# Patient Record
Sex: MALE | Race: WHITE | NOT HISPANIC OR LATINO | URBAN - METROPOLITAN AREA
[De-identification: names, ages, dates, MRNs, and addresses within clinical notes are randomized per-mention and may not be internally consistent; named-entity substitution may affect disease eponyms.]

---

## 2017-10-18 ENCOUNTER — OUTPATIENT (OUTPATIENT)
Dept: OUTPATIENT SERVICES | Facility: HOSPITAL | Age: 62
LOS: 1 days | Discharge: HOME | End: 2017-10-18

## 2017-10-18 DIAGNOSIS — Z01.818 ENCOUNTER FOR OTHER PREPROCEDURAL EXAMINATION: ICD-10-CM

## 2017-10-18 DIAGNOSIS — M16.9 OSTEOARTHRITIS OF HIP, UNSPECIFIED: ICD-10-CM

## 2017-10-18 DIAGNOSIS — M16.11 UNILATERAL PRIMARY OSTEOARTHRITIS, RIGHT HIP: ICD-10-CM

## 2017-11-08 ENCOUNTER — INPATIENT (INPATIENT)
Facility: HOSPITAL | Age: 62
LOS: 1 days | Discharge: NURSING, REHAB OR LONG-TERM CARE | End: 2017-11-10
Attending: ORTHOPAEDIC SURGERY

## 2017-11-08 DIAGNOSIS — M16.9 OSTEOARTHRITIS OF HIP, UNSPECIFIED: ICD-10-CM

## 2017-11-10 ENCOUNTER — INPATIENT (INPATIENT)
Facility: HOSPITAL | Age: 62
LOS: 6 days | Discharge: HOME HEALTH | End: 2017-11-17
Attending: PHYSICAL MEDICINE & REHABILITATION

## 2017-11-10 DIAGNOSIS — M16.9 OSTEOARTHRITIS OF HIP, UNSPECIFIED: ICD-10-CM

## 2017-11-13 DIAGNOSIS — M17.0 BILATERAL PRIMARY OSTEOARTHRITIS OF KNEE: ICD-10-CM

## 2017-11-13 DIAGNOSIS — E66.9 OBESITY, UNSPECIFIED: ICD-10-CM

## 2017-11-13 DIAGNOSIS — F41.9 ANXIETY DISORDER, UNSPECIFIED: ICD-10-CM

## 2017-11-13 DIAGNOSIS — J45.909 UNSPECIFIED ASTHMA, UNCOMPLICATED: ICD-10-CM

## 2017-11-13 DIAGNOSIS — M16.11 UNILATERAL PRIMARY OSTEOARTHRITIS, RIGHT HIP: ICD-10-CM

## 2017-11-13 DIAGNOSIS — I10 ESSENTIAL (PRIMARY) HYPERTENSION: ICD-10-CM

## 2017-11-13 DIAGNOSIS — M16.0 BILATERAL PRIMARY OSTEOARTHRITIS OF HIP: ICD-10-CM

## 2017-11-20 DIAGNOSIS — G47.33 OBSTRUCTIVE SLEEP APNEA (ADULT) (PEDIATRIC): ICD-10-CM

## 2017-11-20 DIAGNOSIS — Z47.1 AFTERCARE FOLLOWING JOINT REPLACEMENT SURGERY: ICD-10-CM

## 2017-11-20 DIAGNOSIS — E66.9 OBESITY, UNSPECIFIED: ICD-10-CM

## 2017-11-20 DIAGNOSIS — J45.909 UNSPECIFIED ASTHMA, UNCOMPLICATED: ICD-10-CM

## 2017-11-20 DIAGNOSIS — Z96.641 PRESENCE OF RIGHT ARTIFICIAL HIP JOINT: ICD-10-CM

## 2017-11-20 DIAGNOSIS — K59.00 CONSTIPATION, UNSPECIFIED: ICD-10-CM

## 2017-11-20 DIAGNOSIS — I10 ESSENTIAL (PRIMARY) HYPERTENSION: ICD-10-CM

## 2021-11-21 ENCOUNTER — EMERGENCY (EMERGENCY)
Facility: HOSPITAL | Age: 66
LOS: 0 days | Discharge: HOME | End: 2021-11-21
Attending: EMERGENCY MEDICINE | Admitting: EMERGENCY MEDICINE
Payer: MEDICARE

## 2021-11-21 VITALS
OXYGEN SATURATION: 95 % | DIASTOLIC BLOOD PRESSURE: 77 MMHG | TEMPERATURE: 98 F | RESPIRATION RATE: 18 BRPM | WEIGHT: 229.94 LBS | SYSTOLIC BLOOD PRESSURE: 133 MMHG | HEART RATE: 58 BPM

## 2021-11-21 DIAGNOSIS — R10.32 LEFT LOWER QUADRANT PAIN: ICD-10-CM

## 2021-11-21 DIAGNOSIS — J45.909 UNSPECIFIED ASTHMA, UNCOMPLICATED: ICD-10-CM

## 2021-11-21 DIAGNOSIS — K57.92 DIVERTICULITIS OF INTESTINE, PART UNSPECIFIED, WITHOUT PERFORATION OR ABSCESS WITHOUT BLEEDING: ICD-10-CM

## 2021-11-21 DIAGNOSIS — I10 ESSENTIAL (PRIMARY) HYPERTENSION: ICD-10-CM

## 2021-11-21 LAB
ALBUMIN SERPL ELPH-MCNC: 4.2 G/DL — SIGNIFICANT CHANGE UP (ref 3.5–5.2)
ALP SERPL-CCNC: 71 U/L — SIGNIFICANT CHANGE UP (ref 30–115)
ALT FLD-CCNC: 24 U/L — SIGNIFICANT CHANGE UP (ref 0–41)
ANION GAP SERPL CALC-SCNC: 14 MMOL/L — SIGNIFICANT CHANGE UP (ref 7–14)
APPEARANCE UR: CLEAR — SIGNIFICANT CHANGE UP
AST SERPL-CCNC: 16 U/L — SIGNIFICANT CHANGE UP (ref 0–41)
BASOPHILS # BLD AUTO: 0.04 K/UL — SIGNIFICANT CHANGE UP (ref 0–0.2)
BASOPHILS NFR BLD AUTO: 0.4 % — SIGNIFICANT CHANGE UP (ref 0–1)
BILIRUB SERPL-MCNC: 1.1 MG/DL — SIGNIFICANT CHANGE UP (ref 0.2–1.2)
BILIRUB UR-MCNC: NEGATIVE — SIGNIFICANT CHANGE UP
BUN SERPL-MCNC: 11 MG/DL — SIGNIFICANT CHANGE UP (ref 10–20)
CALCIUM SERPL-MCNC: 9.6 MG/DL — SIGNIFICANT CHANGE UP (ref 8.5–10.1)
CHLORIDE SERPL-SCNC: 103 MMOL/L — SIGNIFICANT CHANGE UP (ref 98–110)
CO2 SERPL-SCNC: 20 MMOL/L — SIGNIFICANT CHANGE UP (ref 17–32)
COLOR SPEC: YELLOW — SIGNIFICANT CHANGE UP
CREAT SERPL-MCNC: 0.8 MG/DL — SIGNIFICANT CHANGE UP (ref 0.7–1.5)
DIFF PNL FLD: NEGATIVE — SIGNIFICANT CHANGE UP
EOSINOPHIL # BLD AUTO: 0.09 K/UL — SIGNIFICANT CHANGE UP (ref 0–0.7)
EOSINOPHIL NFR BLD AUTO: 0.9 % — SIGNIFICANT CHANGE UP (ref 0–8)
GLUCOSE SERPL-MCNC: 123 MG/DL — HIGH (ref 70–99)
GLUCOSE UR QL: NEGATIVE — SIGNIFICANT CHANGE UP
HCT VFR BLD CALC: 43 % — SIGNIFICANT CHANGE UP (ref 42–52)
HGB BLD-MCNC: 15.1 G/DL — SIGNIFICANT CHANGE UP (ref 14–18)
IMM GRANULOCYTES NFR BLD AUTO: 0.5 % — HIGH (ref 0.1–0.3)
KETONES UR-MCNC: ABNORMAL
LACTATE SERPL-SCNC: 0.9 MMOL/L — SIGNIFICANT CHANGE UP (ref 0.7–2)
LEUKOCYTE ESTERASE UR-ACNC: NEGATIVE — SIGNIFICANT CHANGE UP
LIDOCAIN IGE QN: 23 U/L — SIGNIFICANT CHANGE UP (ref 7–60)
LYMPHOCYTES # BLD AUTO: 1.12 K/UL — LOW (ref 1.2–3.4)
LYMPHOCYTES # BLD AUTO: 10.9 % — LOW (ref 20.5–51.1)
MCHC RBC-ENTMCNC: 32.1 PG — HIGH (ref 27–31)
MCHC RBC-ENTMCNC: 35.1 G/DL — SIGNIFICANT CHANGE UP (ref 32–37)
MCV RBC AUTO: 91.3 FL — SIGNIFICANT CHANGE UP (ref 80–94)
MONOCYTES # BLD AUTO: 0.96 K/UL — HIGH (ref 0.1–0.6)
MONOCYTES NFR BLD AUTO: 9.4 % — HIGH (ref 1.7–9.3)
NEUTROPHILS # BLD AUTO: 7.97 K/UL — HIGH (ref 1.4–6.5)
NEUTROPHILS NFR BLD AUTO: 77.9 % — HIGH (ref 42.2–75.2)
NITRITE UR-MCNC: NEGATIVE — SIGNIFICANT CHANGE UP
NRBC # BLD: 0 /100 WBCS — SIGNIFICANT CHANGE UP (ref 0–0)
PH UR: 6 — SIGNIFICANT CHANGE UP (ref 5–8)
PLATELET # BLD AUTO: 264 K/UL — SIGNIFICANT CHANGE UP (ref 130–400)
POTASSIUM SERPL-MCNC: 4.1 MMOL/L — SIGNIFICANT CHANGE UP (ref 3.5–5)
POTASSIUM SERPL-SCNC: 4.1 MMOL/L — SIGNIFICANT CHANGE UP (ref 3.5–5)
PROT SERPL-MCNC: 6.5 G/DL — SIGNIFICANT CHANGE UP (ref 6–8)
PROT UR-MCNC: SIGNIFICANT CHANGE UP
RBC # BLD: 4.71 M/UL — SIGNIFICANT CHANGE UP (ref 4.7–6.1)
RBC # FLD: 11.7 % — SIGNIFICANT CHANGE UP (ref 11.5–14.5)
SODIUM SERPL-SCNC: 137 MMOL/L — SIGNIFICANT CHANGE UP (ref 135–146)
SP GR SPEC: 1.03 — SIGNIFICANT CHANGE UP (ref 1.01–1.03)
UROBILINOGEN FLD QL: SIGNIFICANT CHANGE UP
WBC # BLD: 10.23 K/UL — SIGNIFICANT CHANGE UP (ref 4.8–10.8)
WBC # FLD AUTO: 10.23 K/UL — SIGNIFICANT CHANGE UP (ref 4.8–10.8)

## 2021-11-21 PROCEDURE — 99284 EMERGENCY DEPT VISIT MOD MDM: CPT

## 2021-11-21 PROCEDURE — 74177 CT ABD & PELVIS W/CONTRAST: CPT | Mod: 26,MA

## 2021-11-21 RX ORDER — CIPROFLOXACIN LACTATE 400MG/40ML
1 VIAL (ML) INTRAVENOUS
Qty: 14 | Refills: 0
Start: 2021-11-21 | End: 2021-11-27

## 2021-11-21 RX ORDER — METRONIDAZOLE 500 MG
1 TABLET ORAL
Qty: 21 | Refills: 0
Start: 2021-11-21 | End: 2021-11-27

## 2021-11-21 RX ORDER — SODIUM CHLORIDE 9 MG/ML
1000 INJECTION INTRAMUSCULAR; INTRAVENOUS; SUBCUTANEOUS ONCE
Refills: 0 | Status: COMPLETED | OUTPATIENT
Start: 2021-11-21 | End: 2021-11-21

## 2021-11-21 RX ORDER — MOXIFLOXACIN HYDROCHLORIDE TABLETS, 400 MG 400 MG/1
1 TABLET, FILM COATED ORAL
Qty: 10 | Refills: 0
Start: 2021-11-21 | End: 2021-11-25

## 2021-11-21 RX ORDER — KETOROLAC TROMETHAMINE 30 MG/ML
15 SYRINGE (ML) INJECTION ONCE
Refills: 0 | Status: DISCONTINUED | OUTPATIENT
Start: 2021-11-21 | End: 2021-11-21

## 2021-11-21 RX ADMIN — SODIUM CHLORIDE 1000 MILLILITER(S): 9 INJECTION INTRAMUSCULAR; INTRAVENOUS; SUBCUTANEOUS at 11:24

## 2021-11-21 NOTE — ED PROVIDER NOTE - CLINICAL SUMMARY MEDICAL DECISION MAKING FREE TEXT BOX
Patient presented with LLQ abdominal pain x several days. (+) tender on exam but otherwise afebrile, HD stable, well appearing. Obtained labs which were grossly unremarkable including no significant leukocytosis, anemia, signs of dehydration/KAROL, transaminitis or significant electrolyte abnormalities. UA negative for infection. CT abd/pelvis showed (+) acute uncomplicated diverticulitis but otherwise negative for other emergent processes. Patient felt much better after tx in ED, and serial abdominal exams benign. Able to tolerate PO. Given the above, will discharge home with PO abx and outpatient follow up. Patient agreeable with plan. Agrees to return to ED for any new or worsening symptoms.

## 2021-11-21 NOTE — ED PROVIDER NOTE - PHYSICAL EXAMINATION
GENERAL: Well-nourished, Well-developed. NAD.  HEAD: No visible or palpable bumps or hematomas. No ecchymosis behind ears B/L.  Eyes: PERRLA, EOMI. No asymmetry. No nystagmus. No conjunctival injection. Non-icteric sclera.  ENMT: MMM.   Neck: Supple. FROM  CVS: RRR. Normal S1,S2. No murmurs appreciated on auscultation   RESP: No use of accessory muscles. Chest rise symmetrical with good expansion. Lungs clear to auscultation B/L. No wheezing, rales, or rhonchi auscultated.  GI: Normal auscultation of bowel sounds in all 4 quadrants. (+)LLQ ttp. Soft, Nondistended. No guarding or rebound tenderness. No CVAT B/L.  Skin: Warm, Dry. No rashes or lesions. Good cap refill < 2 sec B/L.  EXT: Radial and pedal pulses present B/L. No calf tenderness or swelling B/L. No palpable cords. No pedal edema B/L.  Neuro: AA&O x 3. Sensation grossly intact. Strength 5/5 B/L. Gait within normal limits.

## 2021-11-21 NOTE — ED PROVIDER NOTE - NS ED ROS FT
Constitutional: (-) fever (-) malaise (-) diaphoresis (-) chills (-) wt. loss (-) body aches (-) night sweats  Eyes: (-) visual changes (-) eye pain (-) eye discharge (-) photophobia (-) FB sensation  ENMT: (-) nasal or chest congestion (-) runny nose (-) sore throat (-) hoarseness  (-) hearing changes (-) ear pain (-) ear discharge or infections (-) neck pain (-) neck stiffness  Cardiac: (-) chest pain  (-) palpitations (-) syncope (-) edema  Respiratory: (-) cough (-) SOB (-) CORDERO  GI: (-) nausea (-) vomiting (-) diarrhea (+) abdominal pain   : (-) dysuria (-) increased frequency  (-) hematuria (-) incontinence  MS: (-) back pain (-) myalgia (-) muscle weakness (-)  joint pain  Neuro: (-) headache (-) dizziness (-) numbness/tingling to extremities B/L (-) weakness   Skin: (-) rash (-) laceration    Except as documented in the HPI, all other systems are negative.

## 2021-11-21 NOTE — ED PROVIDER NOTE - OBJECTIVE STATEMENT
67 yo M pmhx diverticulitis, asthma, HTN, presenting to the ED for evaluation of LLQ pain x 3 days. Pt reporting constant, sharp, non radiating LLQ pain, reports pain feels the same as when he had diverticulitis 10 years ago. Denies any alleviating or provoking factors. Denies any associated symptoms. Denies fever, chills, nausea, vomiting, diarrhea, constipation, chest pain, sob, dysuria, hematuria, testicular pain.

## 2021-11-21 NOTE — ED ADULT TRIAGE NOTE - CHIEF COMPLAINT QUOTE
"I have diverticulitis and was sent here from clinic for CT scan" intermittent pain. denies n/v/d fever.

## 2021-11-21 NOTE — ED PROVIDER NOTE - ATTENDING CONTRIBUTION TO CARE
66 year old male, pmhx as documented presenting with 3 days of LLQ abdominal pain described as sharp, non-radiating, no palliative or provocative factors, moderate severity. Otherwise denies fevers, N/V/D, blood in stool, urinary symptoms or any other complaints.    Vital Signs: I have reviewed the initial vital signs.  Constitutional: NAD, well-nourished, appears stated age, no acute distress.  HEENT: Airway patent, moist MM, no erythema/swelling/deformity of oral structures. EOMI, PERRLA.  CV: regular rate, regular rhythm, well-perfused extremities, 2+ b/l DP and radial pulses equal.  Lungs: BCTA, no increased WOB.  ABD: (+) LLQ tenderness, no guarding or rebound, no pulsatile mass, no hernias.   MSK: Neck supple, nontender, nl ROM, no stepoff. Chest nontender. Back nontender in TLS spine or to b/l bony structures or flanks. Ext nontender, nl rom, no deformity.   INTEG: Skin warm, dry, no rash.  NEURO: A&Ox3, normal strength, nl sensation throughout, normal speech.   PSYCH: Calm, cooperative, normal affect and interaction.    Will obtain labs, CT abd/pelvis, UA, pain control PRN, re-eval.

## 2021-11-21 NOTE — ED PROVIDER NOTE - PATIENT PORTAL LINK FT
You can access the FollowMyHealth Patient Portal offered by [facility] by registering at the following website: [email or web address]. By joining [employer]’s FollowMyHealth portal, you will also be able to view your health information using other applications (apps) compatible with our system.

## 2021-11-21 NOTE — ED PROVIDER NOTE - CARE PROVIDER_API CALL
Franky Faulkner (DO)  Gastroenterology  [address] [place] [id number]  Phone: [phone]  Fax: [phone]  Follow Up Time: 1-3 Days

## 2021-11-21 NOTE — ED ADULT NURSE NOTE - OBJECTIVE STATEMENT
Pt sent in by [facility] for diverticulitis. Pt states he went to an [facility] with c/o abd pain and dx with diverticulitis, but referred to ED for CT scan. Pt endorses generalized abd pain. Denies n/v/fever/diarrhea.

## 2021-11-22 LAB
CULTURE RESULTS: NO GROWTH — SIGNIFICANT CHANGE UP
SPECIMEN SOURCE: SIGNIFICANT CHANGE UP

## 2022-01-20 NOTE — ED ADULT NURSE NOTE - CAS EDP DISCH TYPE
Transitions of Care Call  Call within 2 business days of discharge: No    Patient: Felipe Castellanos Patient : 1996   MRN: [id number]  Reason for Admission: Covid, Seizure   Discharge Date: 22 RARS: Readmission Risk Score: 5 ( )      Last Discharge [facility]       Complaint Diagnosis Description Type Department Provider    22 Seizures  ED to Hosp-Admission (Discharged) (ADMITTED) Beti Andre MD; Jose Morales . .. Was this an external facility discharge? No Discharge Facility: n/a    Challenges to be reviewed by the provider   Additional needs identified to be addressed with provider: No  none                 Encounter was not routed to provider for escalation. Method of communication with provider: none. Second and final outreach call attempt, no answer. CTN left VM with contact information and request for return call. CTN will resolve episode and remain available. Cannot route message to PCP to facilitate Hospital follow up appointment due to [facility] pcp. No active mychart.     BREONNA Paniagua, RN   Care Transition Nurse  Mobile: [phone] Home negative...

## 2022-05-09 ENCOUNTER — EMERGENCY (EMERGENCY)
Facility: HOSPITAL | Age: 67
LOS: 0 days | Discharge: HOME | End: 2022-05-09
Attending: EMERGENCY MEDICINE | Admitting: EMERGENCY MEDICINE
Payer: MEDICARE

## 2022-05-09 VITALS
HEIGHT: 66 IN | DIASTOLIC BLOOD PRESSURE: 78 MMHG | RESPIRATION RATE: 18 BRPM | SYSTOLIC BLOOD PRESSURE: 169 MMHG | TEMPERATURE: 98 F | OXYGEN SATURATION: 97 % | WEIGHT: 229.94 LBS | HEART RATE: 57 BPM

## 2022-05-09 DIAGNOSIS — J45.909 UNSPECIFIED ASTHMA, UNCOMPLICATED: ICD-10-CM
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
## 2022-05-09 DIAGNOSIS — S60.551A SUPERFICIAL FOREIGN BODY OF RIGHT HAND, INITIAL ENCOUNTER: ICD-10-CM
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
## 2022-05-09 PROBLEM — K57.92 DIVERTICULITIS OF INTESTINE, PART UNSPECIFIED, WITHOUT PERFORATION OR ABSCESS WITHOUT BLEEDING: Chronic | Status: ACTIVE | Noted: 2021-11-28

## 2022-05-09 PROCEDURE — 10120 INC&RMVL FB SUBQ TISS SMPL: CPT | Mod: GC

## 2022-05-09 PROCEDURE — 99283 EMERGENCY DEPT VISIT LOW MDM: CPT | Mod: 25,GC

## 2022-05-09 RX ORDER — CEPHALEXIN 500 MG
1 CAPSULE ORAL
Qty: 40 | Refills: 0
Start: 2022-05-09 | End: 2022-05-18

## 2022-05-09 NOTE — ED PROVIDER NOTE - NS ED ROS FT
Review of Systems:  CONSTITUTIONAL: No fever, No diaphoresis, No weight change  SKIN: No rash, + laceration  HEMATOLOGIC: No abnormal bleeding or bruising  MUSCULOSKELETAL: + joint paint, + swelling, No back pain  NEUROLOGIC: No numbness, No focal weakness, No headache, No dizziness  All other systems negative, unless specified in HPI

## 2022-05-09 NOTE — ED PROVIDER NOTE - ATTENDING CONTRIBUTION TO CARE
67 y/o male p/w FB to rt hand sustained PTA, got splinter from 2 x 4 beam impaled in hand 1st webspace, sx are constant, denies fever, tactile temp, chills, paresthesias, focal weakness, or other associated complaints at present.     Old chart reviewed.  I have reviewed and agree with the initial nursing note, except as documented in my note.    VSS, awake, alert, in NAD, RUE; no shoulder, elbow, wrist tenderness, hand; fb to 1st webspace, otherwise appears symmetrical, no gross deformity, crepitus, swelling, tenderness or snuffbox tenderness, no warmth, erythema, induration, fluctuance, lymphangitis or purulence, ROM intact, no pain with passive ROM, compartments non-tense, sensory and motor function to radial, median and ulnar nerve intact. AB/AD duction, flexion, extension of digits and opposition of thumb normal, NV intact, capillary refill <3 seconds, 2+ radial pulses, pain not out of proportion to exam not appreciated.

## 2022-05-09 NOTE — ED PROVIDER NOTE - NSFOLLOWUPINSTRUCTIONS_ED_ALL_ED_FT
- Please  the medication sent to your pharmacy and take as prescribed  - Looks out for signs of infection  - Please follow up with your Primary Care Physician      Skin Foreign Body  A skin foreign body is an object that is stuck in the skin. Common objects that get stuck in the skin include:    Wood (splinter).  Glass.  Rock.  Nails.  Needles.  Thorns or cactus spines.  Fiberglass slivers.  Fish hooks.  BBs.    Foreign bodies may damage tissue or cause infection. If the foreign body does not cause any pain or infection, it may be okay to leave it in the skin.    What are the causes?  This condition is caused by an object getting lodged under the skin, usually by accident. Children may get a skin foreign body while playing outside. Adults may get a skin foreign body after breaking glass or while working with wood, fiberglass, or stone material. In some cases, the object may get stuck in an open wound after an injury.    What are the signs or symptoms?  Symptoms of this condition include:    Pain.  A feeling of something being stuck under the skin.    How is this diagnosed?  This condition is diagnosed based on:    Your medical history and symptoms.  A physical exam.  Imaging tests, such as:    X-rays.  CT scans.  Ultrasounds.      How is this treated?  Treatment for this condition depends on what the foreign body is, where it is, and whether it is causing infection or other symptoms. Treatment may involve:    Removing all or part of the object with a needle and metal tweezers. In some cases, an incision may be made in the skin to allow access to the object.  Waiting to remove the object until it moves closer to the surface of the skin. This may take several days.  Leaving the object in place. This may be done if the object is not causing any symptoms or if removal will cause more damage to the skin or tissue.  Antibiotic pills or ointment to treat or prevent infection.    Follow these instructions at home:  Wound or incision care     If the foreign body was removed, follow instructions from your health care provider about how to take care of your wound or incision. Make sure you:    Wash your hands with soap and water before you change your bandage (dressing). If soap and water are not available, use hand .  Change your dressing as told by your health care provider.  Leave stitches (sutures), skin glue, or adhesive strips in place. These skin closures may need to stay in place for 2 weeks or longer. If adhesive strip edges start to loosen and curl up, you may trim the loose edges. Do not remove adhesive strips completely unless your health care provider tells you to do that.    ImageCheck your wound or incision every day for signs of infection. This is especially important if the foreign body was left in place in the skin. Check for:    Redness, swelling, or pain.  Fluid or blood.  Pus or a bad smell.  Warmth.    General instructions     Take over-the-counter and prescription medicines only as told by your health care provider.  If you were prescribed an antibiotic medicine or ointment, use it as told by your health care provider. Do not stop using the antibiotic even if you start to feel better.  Keep all follow-up visits as told by your health care provider. This is important.  Contact a health care provider if:  You develop more pain or other new symptoms around the area where the object entered the skin.  You have redness, swelling, or pain around your wound or incision.  You have fluid or blood coming from your wound or incision.  Your wound or incision feels warm to the touch.  You have pus or a bad smell coming from your wound or incision.  You have a fever.  Get help right away if:  You have severe pain that does not get better with medicine.  Summary  A skin foreign body is an object that is stuck in the skin. Common objects that get stuck in the skin include wood, glass, rock, thorns, and fiberglass slivers.  Treatment for this condition depends on what the foreign body is, where it is, and whether it is causing infection or other symptoms.  Treatment may include removing the foreign body or leaving it in place. It is important to watch the wound or incision for signs of infection, especially if the object was left in place in the skin.  This information is not intended to replace advice given to you by your health care provider. Make sure you discuss any questions you have with your health care provider.

## 2022-05-09 NOTE — ED PROVIDER NOTE - PATIENT PORTAL LINK FT
You can access the FollowMyHealth Patient Portal offered by [facility] by registering at the following website: [email or web address]. By joining [employer]’s FollowMyHealth portal, you will also be able to view your health information using other applications (apps) compatible with our system.

## 2022-05-09 NOTE — ED PROVIDER NOTE - PHYSICAL EXAMINATION
CONSTITUTIONAL: in no acute distress, afebrile  SKIN: Warm, dry  EXT: Normal ROM.  No clubbing or cyanosis.  No edema; linear wooden splinter through R palmar aspect between 1st and 2nd digits, neurovascularly in tact, no bleeding or discharge, superficial, palpable, no fluctuance  NEURO: A&O x3, grossly unremarkable, no focal deficits  PSYCH: Cooperative, appropriate  *Chaperone was used during the encounter

## 2022-05-09 NOTE — ED PROVIDER NOTE - OBJECTIVE STATEMENT
67 yo M with PMH Asthma who presents with splinter to R hand.  Pt was working with wooden beams, pierced through R palmar hand.  Pt went to , received tetanus then came to ER. Pt denies numbness, tingling, bleeding, fevers, chills, focal weakness.

## 2022-05-09 NOTE — ED PROVIDER NOTE - PROGRESS NOTE DETAILS
AH - splinter successfully removed whole in one piece, irrigated well, received tetanus at  today, antibx given; Patient to be discharged from ED. Any available test results were discussed with patient and/or family. Verbal instructions given, including instructions to return to ED immediately for any new, worsening, or concerning symptoms. Strict return precautions given. Patient endorsed understanding. Written discharge instructions additionally given, including follow-up plan

## 2022-05-09 NOTE — ED ADULT NURSE NOTE - CHIEF COMPLAINT QUOTE
Pt here with right hand splinter. Was seen in [facility] and was given tetanus shot but was unable to get it removed.

## 2022-09-13 NOTE — ED PROVIDER NOTE - DISPOSITION TYPE
DISCHARGE [Fever] : no fever [Chills] : no chills [Feeling Poorly] : feeling poorly [Feeling Tired] : not feeling tired [Eye Pain] : no eye pain [Eyesight Problems] : no eyesight problems [Nasal Discharge] : no nasal discharge [Chest Pain] : no chest pain [Shortness Of Breath] : no shortness of breath [Cough] : no cough [Constipation] : no constipation [Arthralgias] : arthralgias [Itching] : no itching [As Noted in HPI] : as noted in HPI [Sleep Disturbances] : sleep disturbances [Muscle Weakness] : no muscle weakness

## 2023-06-15 NOTE — ED ADULT NURSE NOTE - NSFALLRSKINDICATORS_ED_ALL_ED
no
Detail Level: Simple
Comment: Cyst; long-standing 7+ years. Secondary to trauma. \\nUltrasound was ordered by Dr. Gant, reviewed and returned suspicious for sebaceous cyst.\\nExcised today.
Render Risk Assessment In Note?: no

## 2023-11-20 PROBLEM — Z00.00 ENCOUNTER FOR PREVENTIVE HEALTH EXAMINATION: Status: ACTIVE | Noted: 2023-11-20

## 2023-12-05 ENCOUNTER — RESULT CHARGE (OUTPATIENT)
Age: 68
End: 2023-12-05

## 2023-12-05 ENCOUNTER — APPOINTMENT (OUTPATIENT)
Dept: ORTHOPEDIC SURGERY | Facility: CLINIC | Age: 68
End: 2023-12-05
Payer: MEDICARE

## 2023-12-05 DIAGNOSIS — M17.12 UNILATERAL PRIMARY OSTEOARTHRITIS, LEFT KNEE: ICD-10-CM

## 2023-12-05 PROCEDURE — 99203 OFFICE O/P NEW LOW 30 MIN: CPT

## 2023-12-05 PROCEDURE — 73560 X-RAY EXAM OF KNEE 1 OR 2: CPT | Mod: 50

## 2024-02-13 ENCOUNTER — OUTPATIENT (OUTPATIENT)
Dept: OUTPATIENT SERVICES | Facility: HOSPITAL | Age: 69
LOS: 1 days | End: 2024-02-13
Payer: MEDICARE

## 2024-02-13 ENCOUNTER — RESULT REVIEW (OUTPATIENT)
Age: 69
End: 2024-02-13

## 2024-02-13 VITALS
DIASTOLIC BLOOD PRESSURE: 78 MMHG | TEMPERATURE: 98 F | HEART RATE: 60 BPM | RESPIRATION RATE: 16 BRPM | SYSTOLIC BLOOD PRESSURE: 160 MMHG | HEIGHT: 68 IN | OXYGEN SATURATION: 97 % | WEIGHT: 217.16 LBS

## 2024-02-13 DIAGNOSIS — H40.9 UNSPECIFIED GLAUCOMA: Chronic | ICD-10-CM

## 2024-02-13 DIAGNOSIS — Z96.641 PRESENCE OF RIGHT ARTIFICIAL HIP JOINT: Chronic | ICD-10-CM

## 2024-02-13 DIAGNOSIS — Z98.890 OTHER SPECIFIED POSTPROCEDURAL STATES: Chronic | ICD-10-CM

## 2024-02-13 DIAGNOSIS — M17.12 UNILATERAL PRIMARY OSTEOARTHRITIS, LEFT KNEE: ICD-10-CM

## 2024-02-13 DIAGNOSIS — Z01.818 ENCOUNTER FOR OTHER PREPROCEDURAL EXAMINATION: ICD-10-CM

## 2024-02-13 LAB
A1C WITH ESTIMATED AVERAGE GLUCOSE RESULT: 6.4 % — HIGH (ref 4–5.6)
ALBUMIN SERPL ELPH-MCNC: 4.7 G/DL — SIGNIFICANT CHANGE UP (ref 3.5–5.2)
ALP SERPL-CCNC: 69 U/L — SIGNIFICANT CHANGE UP (ref 30–115)
ALT FLD-CCNC: 25 U/L — SIGNIFICANT CHANGE UP (ref 0–41)
ANION GAP SERPL CALC-SCNC: 15 MMOL/L — HIGH (ref 7–14)
APTT BLD: 31.7 SEC — SIGNIFICANT CHANGE UP (ref 27–39.2)
AST SERPL-CCNC: 25 U/L — SIGNIFICANT CHANGE UP (ref 0–41)
BASOPHILS # BLD AUTO: 0.06 K/UL — SIGNIFICANT CHANGE UP (ref 0–0.2)
BASOPHILS NFR BLD AUTO: 1 % — SIGNIFICANT CHANGE UP (ref 0–1)
BILIRUB SERPL-MCNC: 0.8 MG/DL — SIGNIFICANT CHANGE UP (ref 0.2–1.2)
BLD GP AB SCN SERPL QL: SIGNIFICANT CHANGE UP
BUN SERPL-MCNC: 18 MG/DL — SIGNIFICANT CHANGE UP (ref 10–20)
CALCIUM SERPL-MCNC: 10 MG/DL — SIGNIFICANT CHANGE UP (ref 8.4–10.5)
CHLORIDE SERPL-SCNC: 100 MMOL/L — SIGNIFICANT CHANGE UP (ref 98–110)
CO2 SERPL-SCNC: 21 MMOL/L — SIGNIFICANT CHANGE UP (ref 17–32)
CREAT SERPL-MCNC: 0.9 MG/DL — SIGNIFICANT CHANGE UP (ref 0.7–1.5)
EGFR: 92 ML/MIN/1.73M2 — SIGNIFICANT CHANGE UP
EOSINOPHIL # BLD AUTO: 0.11 K/UL — SIGNIFICANT CHANGE UP (ref 0–0.7)
EOSINOPHIL NFR BLD AUTO: 1.8 % — SIGNIFICANT CHANGE UP (ref 0–8)
ESTIMATED AVERAGE GLUCOSE: 137 MG/DL — HIGH (ref 68–114)
GLUCOSE SERPL-MCNC: 117 MG/DL — HIGH (ref 70–99)
HCT VFR BLD CALC: 49.3 % — SIGNIFICANT CHANGE UP (ref 42–52)
HGB BLD-MCNC: 16.2 G/DL — SIGNIFICANT CHANGE UP (ref 14–18)
IMM GRANULOCYTES NFR BLD AUTO: 0.3 % — SIGNIFICANT CHANGE UP (ref 0.1–0.3)
INR BLD: 1.06 RATIO — SIGNIFICANT CHANGE UP (ref 0.65–1.3)
LYMPHOCYTES # BLD AUTO: 1.19 K/UL — LOW (ref 1.2–3.4)
LYMPHOCYTES # BLD AUTO: 19.6 % — LOW (ref 20.5–51.1)
MCHC RBC-ENTMCNC: 30.6 PG — SIGNIFICANT CHANGE UP (ref 27–31)
MCHC RBC-ENTMCNC: 32.9 G/DL — SIGNIFICANT CHANGE UP (ref 32–37)
MCV RBC AUTO: 93 FL — SIGNIFICANT CHANGE UP (ref 80–94)
MONOCYTES # BLD AUTO: 0.56 K/UL — SIGNIFICANT CHANGE UP (ref 0.1–0.6)
MONOCYTES NFR BLD AUTO: 9.2 % — SIGNIFICANT CHANGE UP (ref 1.7–9.3)
MRSA PCR RESULT.: POSITIVE
NEUTROPHILS # BLD AUTO: 4.14 K/UL — SIGNIFICANT CHANGE UP (ref 1.4–6.5)
NEUTROPHILS NFR BLD AUTO: 68.1 % — SIGNIFICANT CHANGE UP (ref 42.2–75.2)
NRBC # BLD: 0 /100 WBCS — SIGNIFICANT CHANGE UP (ref 0–0)
PLATELET # BLD AUTO: 300 K/UL — SIGNIFICANT CHANGE UP (ref 130–400)
PMV BLD: 10.9 FL — HIGH (ref 7.4–10.4)
POTASSIUM SERPL-MCNC: 4.5 MMOL/L — SIGNIFICANT CHANGE UP (ref 3.5–5)
POTASSIUM SERPL-SCNC: 4.5 MMOL/L — SIGNIFICANT CHANGE UP (ref 3.5–5)
PROT SERPL-MCNC: 7 G/DL — SIGNIFICANT CHANGE UP (ref 6–8)
PROTHROM AB SERPL-ACNC: 12.1 SEC — SIGNIFICANT CHANGE UP (ref 9.95–12.87)
RBC # BLD: 5.3 M/UL — SIGNIFICANT CHANGE UP (ref 4.7–6.1)
RBC # FLD: 11.9 % — SIGNIFICANT CHANGE UP (ref 11.5–14.5)
SODIUM SERPL-SCNC: 136 MMOL/L — SIGNIFICANT CHANGE UP (ref 135–146)
WBC # BLD: 6.08 K/UL — SIGNIFICANT CHANGE UP (ref 4.8–10.8)
WBC # FLD AUTO: 6.08 K/UL — SIGNIFICANT CHANGE UP (ref 4.8–10.8)

## 2024-02-13 PROCEDURE — 72170 X-RAY EXAM OF PELVIS: CPT

## 2024-02-13 PROCEDURE — 93005 ELECTROCARDIOGRAM TRACING: CPT

## 2024-02-13 PROCEDURE — 83036 HEMOGLOBIN GLYCOSYLATED A1C: CPT

## 2024-02-13 PROCEDURE — 99214 OFFICE O/P EST MOD 30 MIN: CPT | Mod: 25

## 2024-02-13 PROCEDURE — 86901 BLOOD TYPING SEROLOGIC RH(D): CPT

## 2024-02-13 PROCEDURE — 73562 X-RAY EXAM OF KNEE 3: CPT | Mod: 26,LT

## 2024-02-13 PROCEDURE — 85610 PROTHROMBIN TIME: CPT

## 2024-02-13 PROCEDURE — 72170 X-RAY EXAM OF PELVIS: CPT | Mod: 26

## 2024-02-13 PROCEDURE — 87640 STAPH A DNA AMP PROBE: CPT | Mod: XU

## 2024-02-13 PROCEDURE — 87641 MR-STAPH DNA AMP PROBE: CPT

## 2024-02-13 PROCEDURE — 86900 BLOOD TYPING SEROLOGIC ABO: CPT

## 2024-02-13 PROCEDURE — 93010 ELECTROCARDIOGRAM REPORT: CPT

## 2024-02-13 PROCEDURE — 71046 X-RAY EXAM CHEST 2 VIEWS: CPT | Mod: 26

## 2024-02-13 PROCEDURE — 80053 COMPREHEN METABOLIC PANEL: CPT

## 2024-02-13 PROCEDURE — 71046 X-RAY EXAM CHEST 2 VIEWS: CPT

## 2024-02-13 PROCEDURE — 73562 X-RAY EXAM OF KNEE 3: CPT | Mod: LT

## 2024-02-13 PROCEDURE — 36415 COLL VENOUS BLD VENIPUNCTURE: CPT

## 2024-02-13 PROCEDURE — 86850 RBC ANTIBODY SCREEN: CPT

## 2024-02-13 PROCEDURE — 85730 THROMBOPLASTIN TIME PARTIAL: CPT

## 2024-02-13 PROCEDURE — 85025 COMPLETE CBC W/AUTO DIFF WBC: CPT

## 2024-02-13 NOTE — H&P PST ADULT - NSICDXPASTSURGICALHX_GEN_ALL_CORE_FT
PAST SURGICAL HISTORY:  Glaucomatous flecks (subcapsular), left eye     History of total right hip replacement     S/P ACL surgery

## 2024-02-13 NOTE — H&P PST ADULT - HISTORY OF PRESENT ILLNESS
Patient is 69 yo male who presents to pretesting for the preparations of the left total knee replacement due to sharp pain scale 5/10 and partial reliefs with alternatives.   Patient denies any cp, sob, palpitations, fever, cough, URI, abdominal pains, N/V, UTI, Rashes or open wounds.  As per patient exercise tolerance of 1-2 fos walks with out sob  Patient denies any s/s covid 19 and reports no contact with known positive people. Patient has appointment for repeat covid testing pre op and instructed to continue to self monitor and report any concerns to MD. Pt will continue to practice self isolation and  exposure control measures pre op  Anesthesia Alert  NO--Difficult Airway  NO--History of neck surgery or radiation  NO--Limited ROM of neck  NO--History of Malignant hyperthermia  NO--Personal or family history of Pseudocholinesterase deficiency  NO--Prior Anesthesia Complication  NO--Latex Allergy  NO--Loose teeth  NO--History of Rheumatoid Arthritis  NO--PAT  NO--Risk of Bleedings   [place] bilateral   Pt instructed to stop vitamins/supplements/herbal medications for one week prior to surgery  As per patient this is the complete medical, surgical history and medications.  Duke Activity Status Index (DASI) from [employer]  on 2/13/2024  ** All calculations should be rechecked by clinician prior to use **  RESULT SUMMARY:  42.7 points  The higher the score (maximum 58.2), the higher the functional status.  7.99 METs  INPUTS:  Take care of self —> 2.75 = Yes  Walk indoors —> 1.75 = Yes  Walk 1&ndash;2 blocks on level ground —> 2.75 = Yes  Climb a flight of stairs or walk up a hill —> 5.5 = Yes  Run a short distance —> 0 = No  Do light work around the house —> 2.7 = Yes  Do moderate work around the house —> 3.5 = Yes  Do heavy work around the house —> 8 = Yes  Do yardwork —> 4.5 = Yes  Have sexual relations —> 5.25 = Yes  Participate in moderate recreational activities —> 6 = Yes  Participate in strenuous sports —> 0 = No  Revised Cardiac Risk Index for Pre-Operative Risk from [employer]  on 2/13/2024  ** All calculations should be rechecked by clinician prior to use **    RESULT SUMMARY:  0 points  Class I Risk  3.9 %  30-day risk of death, MI, or cardiac arrest  From [employer] 2017. These numbers are higher than those from the original study (Dalton 1999). See Evidence for details.  INPUTS:  Elevated-risk surgery —> 0 = No  History of ischemic heart disease —> 0 = No  History of congestive heart failure —> 0 = No  History of cerebrovascular disease —> 0 = No  Pre-operative treatment with insulin —> 0 = No  Pre-operative creatinine >2 mg/dL / 176.8 µmol/L —> 0 = No   Patient is 67 yo male who presents to pretesting for the preparations of the left total knee replacement due to sharp left knee pains scale 5/10 and partial reliefs with alternatives, Limited ROM and swelling on knee and left ankle.   Patient denies any cp, sob, palpitations, fever, cough, URI, abdominal pains, N/V, UTI, Rashes or open wounds.  As per patient exercise tolerance of 1 fos walks with out sob. Patient limping with left knee pains   Patient denies any s/s covid 19 and reports no contact with known positive people. Patient has appointment for repeat covid testing pre op and instructed to continue to self monitor and report any concerns to MD. Pt will continue to practice self isolation and  exposure control measures pre op  Anesthesia Alert  NO--Difficult Airway  NO--History of neck surgery or radiation  NO--Limited ROM of neck  NO--History of Malignant hyperthermia  NO--Personal or family history of Pseudocholinesterase deficiency  Yes--Prior Anesthesia Complication. Extreme cold feelings  NO--Latex Allergy  NO--Loose teeth  NO--History of Rheumatoid Arthritis  YES--PAT. CPAP in use Advised to bring it on the DOS   NO--Risk of Bleedings   [place] bilateral   Left Eye injury from childhood ( nail poked in to left eye   EKG SB WITH first degree AVB   Pt instructed to stop vitamins/supplements/herbal medications for one week prior to surgery  As per patient this is the complete medical, surgical history and medications.  Duke Activity Status Index (DASI) from [employer]  on 2/13/2024  ** All calculations should be rechecked by clinician prior to use **  RESULT SUMMARY:  42.7 points  The higher the score (maximum 58.2), the higher the functional status.  7.99 METs  INPUTS:  Take care of self —> 2.75 = Yes  Walk indoors —> 1.75 = Yes  Walk 1&ndash;2 blocks on level ground —> 2.75 = Yes  Climb a flight of stairs or walk up a hill —> 5.5 = Yes  Run a short distance —> 0 = No  Do light work around the house —> 2.7 = Yes  Do moderate work around the house —> 3.5 = Yes  Do heavy work around the house —> 8 = Yes  Do yard work —> 4.5 = Yes  Have sexual relations —> 5.25 = Yes  Participate in moderate recreational activities —> 6 = Yes  Participate in strenuous sports —> 0 = No  Revised Cardiac Risk Index for Pre-Operative Risk from [employer]  on 2/13/2024  ** All calculations should be rechecked by clinician prior to use **  RESULT SUMMARY:  0 points  Class I Risk  3.9 %  30-day risk of death, MI, or cardiac arrest  From [employer] 2017. These numbers are higher than those from the original study (Dalton 1999). See Evidence for details.  INPUTS:  Elevated-risk surgery —> 0 = No  History of ischemic heart disease —> 0 = No  History of congestive heart failure —> 0 = No  History of cerebrovascular disease —> 0 = No  Pre-operative treatment with insulin —> 0 = No  Pre-operative creatinine >2 mg/dL / 176.8 µmol/L —> 0 = No

## 2024-02-13 NOTE — H&P PST ADULT - REASON FOR ADMISSION
Case Type: OP   Suite: OR [facility]  Proceduralist: Endy Reeves  Confirmed Surgery Date Time: 02-   PAST Date Time: 02- - 8:15  Procedure: Left Total Knee Replacement  Laterality: Left  Length of Procedure: 120 Minutes  Anesthesia Type: Regional

## 2024-02-13 NOTE — H&P PST ADULT - NSICDXPASTMEDICALHX_GEN_ALL_CORE_FT
PAST MEDICAL HISTORY:  Asthma     COPD (chronic obstructive pulmonary disease)     Diverticulitis     GERD (gastroesophageal reflux disease)     History of glaucoma     HTN (hypertension)      PAST MEDICAL HISTORY:  Asthma     COPD (chronic obstructive pulmonary disease)     Diverticulitis     GERD (gastroesophageal reflux disease)     H/O eye injury     History of glaucoma     HTN (hypertension)

## 2024-02-13 NOTE — H&P PST ADULT - MUSCULOSKELETAL
normal/ROM intact/normal gait/strength 5/5 bilateral upper extremities/strength 5/5 bilateral lower extremities details… Left knee and left ankle/decreased ROM due to pain/joint swelling/strength 5/5 bilateral upper extremities/abnormal gait

## 2024-02-13 NOTE — H&P PST ADULT - GASTROINTESTINAL
normal/soft/nontender/nondistended/normal active bowel sounds normal/soft/nontender/nondistended/normal active bowel sounds/no guarding

## 2024-02-13 NOTE — H&P PST ADULT - SKIN
warm and dry/color normal/normal/no rashes/no ulcers warm and dry/color normal/swelling/no rashes/no ulcers

## 2024-02-14 DIAGNOSIS — M17.12 UNILATERAL PRIMARY OSTEOARTHRITIS, LEFT KNEE: ICD-10-CM

## 2024-02-14 DIAGNOSIS — Z01.818 ENCOUNTER FOR OTHER PREPROCEDURAL EXAMINATION: ICD-10-CM

## 2024-02-15 DIAGNOSIS — Z22.322 CARRIER OR SUSPECTED CARRIER OF METHICILLIN RESISTANT STAPHYLOCOCCUS AUREUS: ICD-10-CM

## 2024-02-15 RX ORDER — MUPIROCIN 20 MG/G
2 OINTMENT TOPICAL
Qty: 1 | Refills: 0 | Status: ACTIVE | COMMUNITY
Start: 2024-02-15 | End: 1900-01-01

## 2024-02-26 NOTE — DATA REVIEWED
[FreeTextEntry1] : X-ray bilateral knees for comparison: No acute fractures, subluxations, dislocations.  Right knee mild to moderate osteoarthritis.  Left knee severe osteoarthritis with surgical hardware intact from prior ACL reconstruction

## 2024-02-26 NOTE — HISTORY OF PRESENT ILLNESS
[de-identified] : Patient is a 68-year-old male here for evaluation of left knee.  Patient has a long history of osteoarthritis of left knee and was consulted on total left knee replacement about 3 to 5 years ago.  Patient states at this point his left knee pain has been worsening with no injury or trauma.  Patient is now interested in total knee replacement.  Pain worsens with weather changes and activities.

## 2024-02-26 NOTE — DISCUSSION/SUMMARY
[de-identified] : Discussed x-rays in detail with patient showing severe osteoarthritis of left knee.  Discussed treatment options in detail including rest, ice that she can range of motion exercise, physical therapy, cortisone injection, total knee replacement surgery.  At this point patient has tried conservative management and has not helped.  Patient interested in total knee replacement.  We discussed the surgery, including a description of the surgery in layman's terms, preoperative evaluation, the hospital stay, anesthesia, and expected outcome.     Models equivalent to the actual knee replacement prosthesis were used to demonstrate the magnitude and scope of the surgery.  Various modes of implant fixation including cement and biologic fixation were described.  The patient shared in the decision making and agreed to the use of implants.       Additionally surgical risks were discussed. The patient has a good understanding of the inherent risks of surgery which include bleeding, pain, and infection, blood clots, and any medical issues that may arise in the perioperative period.  Additionally, we discussed risks uniquely pertinent to knee replacement surgery, including arthrofibrosis, instability, loosening, numbness around the incision, nerve injury, and possible need for revision surgery should the replacement not function optimally.  All questions were answered and the patient verbalized understanding. I encouraged the patient to contact me should any further questions or issues arise.   Patient will require a rolling walker 2-4 weeks post-op for ambulation and ADLs   Patient will also require commode as they are confined to one level without a bathroom    Seen with Dr. Elliott

## 2024-02-26 NOTE — PHYSICAL EXAM
[] : no extensor lag [[id number]] : flexion 115 degrees [de-identified] : extension 0 degrees

## 2024-02-28 ENCOUNTER — RESULT REVIEW (OUTPATIENT)
Age: 69
End: 2024-02-28

## 2024-02-28 ENCOUNTER — TRANSCRIPTION ENCOUNTER (OUTPATIENT)
Age: 69
End: 2024-02-28

## 2024-02-28 ENCOUNTER — INPATIENT (INPATIENT)
Facility: HOSPITAL | Age: 69
LOS: 0 days | Discharge: HOME HEALTH | DRG: 470 | End: 2024-02-29
Attending: ORTHOPAEDIC SURGERY | Admitting: ORTHOPAEDIC SURGERY
Payer: MEDICARE

## 2024-02-28 ENCOUNTER — APPOINTMENT (OUTPATIENT)
Dept: ORTHOPEDIC SURGERY | Facility: HOSPITAL | Age: 69
End: 2024-02-28

## 2024-02-28 VITALS
DIASTOLIC BLOOD PRESSURE: 77 MMHG | SYSTOLIC BLOOD PRESSURE: 166 MMHG | HEIGHT: 66 IN | OXYGEN SATURATION: 96 % | TEMPERATURE: 97 F | RESPIRATION RATE: 17 BRPM | WEIGHT: 220.02 LBS | HEART RATE: 59 BPM

## 2024-02-28 DIAGNOSIS — H40.9 UNSPECIFIED GLAUCOMA: Chronic | ICD-10-CM

## 2024-02-28 DIAGNOSIS — Z98.890 OTHER SPECIFIED POSTPROCEDURAL STATES: Chronic | ICD-10-CM

## 2024-02-28 DIAGNOSIS — M17.12 UNILATERAL PRIMARY OSTEOARTHRITIS, LEFT KNEE: ICD-10-CM

## 2024-02-28 DIAGNOSIS — Z96.641 PRESENCE OF RIGHT ARTIFICIAL HIP JOINT: Chronic | ICD-10-CM

## 2024-02-28 LAB — GLUCOSE BLDC GLUCOMTR-MCNC: 129 MG/DL — HIGH (ref 70–99)

## 2024-02-28 PROCEDURE — 97110 THERAPEUTIC EXERCISES: CPT | Mod: GP

## 2024-02-28 PROCEDURE — 73560 X-RAY EXAM OF KNEE 1 OR 2: CPT | Mod: LT

## 2024-02-28 PROCEDURE — 73560 X-RAY EXAM OF KNEE 1 OR 2: CPT | Mod: 26,LT

## 2024-02-28 PROCEDURE — 27447 TOTAL KNEE ARTHROPLASTY: CPT | Mod: LT

## 2024-02-28 PROCEDURE — 80048 BASIC METABOLIC PNL TOTAL CA: CPT

## 2024-02-28 PROCEDURE — 88311 DECALCIFY TISSUE: CPT

## 2024-02-28 PROCEDURE — 97165 OT EVAL LOW COMPLEX 30 MIN: CPT | Mod: GO

## 2024-02-28 PROCEDURE — 88305 TISSUE EXAM BY PATHOLOGIST: CPT | Mod: 26

## 2024-02-28 PROCEDURE — C1713: CPT

## 2024-02-28 PROCEDURE — 85027 COMPLETE CBC AUTOMATED: CPT

## 2024-02-28 PROCEDURE — 97162 PT EVAL MOD COMPLEX 30 MIN: CPT | Mod: GP

## 2024-02-28 PROCEDURE — 88311 DECALCIFY TISSUE: CPT | Mod: 26

## 2024-02-28 PROCEDURE — 36415 COLL VENOUS BLD VENIPUNCTURE: CPT

## 2024-02-28 PROCEDURE — 88305 TISSUE EXAM BY PATHOLOGIST: CPT

## 2024-02-28 PROCEDURE — C1776: CPT

## 2024-02-28 PROCEDURE — 97116 GAIT TRAINING THERAPY: CPT | Mod: GP

## 2024-02-28 RX ORDER — POLYETHYLENE GLYCOL 3350 17 G/17G
17 POWDER, FOR SOLUTION ORAL AT BEDTIME
Refills: 0 | Status: DISCONTINUED | OUTPATIENT
Start: 2024-02-28 | End: 2024-02-29

## 2024-02-28 RX ORDER — ACETAMINOPHEN, CHLORPHENIRAMINE MALEATE, AND PHENYLEPHRINE HYDROCHLORIDE 325; 2; 5 MG/1; MG/1; MG/1
2 TABLET, COATED ORAL
Refills: 0 | DISCHARGE

## 2024-02-28 RX ORDER — PANTOPRAZOLE SODIUM 20 MG/1
40 TABLET, DELAYED RELEASE ORAL
Refills: 0 | Status: DISCONTINUED | OUTPATIENT
Start: 2024-02-28 | End: 2024-02-29

## 2024-02-28 RX ORDER — SENNA PLUS 8.6 MG/1
2 TABLET ORAL AT BEDTIME
Refills: 0 | Status: DISCONTINUED | OUTPATIENT
Start: 2024-02-28 | End: 2024-02-29

## 2024-02-28 RX ORDER — CEFAZOLIN SODIUM 1 G
2000 VIAL (EA) INJECTION EVERY 8 HOURS
Refills: 0 | Status: COMPLETED | OUTPATIENT
Start: 2024-02-28 | End: 2024-02-28

## 2024-02-28 RX ORDER — FAMOTIDINE 10 MG/ML
40 INJECTION INTRAVENOUS
Refills: 0 | Status: DISCONTINUED | OUTPATIENT
Start: 2024-02-28 | End: 2024-02-29

## 2024-02-28 RX ORDER — ACETAMINOPHEN 500 MG
650 TABLET ORAL EVERY 6 HOURS
Refills: 0 | Status: DISCONTINUED | OUTPATIENT
Start: 2024-02-28 | End: 2024-02-29

## 2024-02-28 RX ORDER — CELECOXIB 200 MG/1
200 CAPSULE ORAL ONCE
Refills: 0 | Status: COMPLETED | OUTPATIENT
Start: 2024-02-28 | End: 2024-02-28

## 2024-02-28 RX ORDER — LEVALBUTEROL 1.25 MG/.5ML
2 SOLUTION, CONCENTRATE RESPIRATORY (INHALATION)
Refills: 0 | DISCHARGE

## 2024-02-28 RX ORDER — DEXAMETHASONE 0.5 MG/5ML
4 ELIXIR ORAL ONCE
Refills: 0 | Status: COMPLETED | OUTPATIENT
Start: 2024-02-29 | End: 2024-02-29

## 2024-02-28 RX ORDER — FAMOTIDINE 10 MG/ML
1 INJECTION INTRAVENOUS
Refills: 0 | DISCHARGE

## 2024-02-28 RX ORDER — AZELASTINE 137 UG/1
2 SPRAY, METERED NASAL
Refills: 0 | DISCHARGE

## 2024-02-28 RX ORDER — TRAMADOL HYDROCHLORIDE 50 MG/1
50 TABLET ORAL EVERY 4 HOURS
Refills: 0 | Status: DISCONTINUED | OUTPATIENT
Start: 2024-02-28 | End: 2024-02-29

## 2024-02-28 RX ORDER — ONDANSETRON 8 MG/1
4 TABLET, FILM COATED ORAL EVERY 6 HOURS
Refills: 0 | Status: DISCONTINUED | OUTPATIENT
Start: 2024-02-28 | End: 2024-02-29

## 2024-02-28 RX ORDER — LATANOPROST 0.05 MG/ML
1 SOLUTION/ DROPS OPHTHALMIC; TOPICAL AT BEDTIME
Refills: 0 | Status: DISCONTINUED | OUTPATIENT
Start: 2024-02-28 | End: 2024-02-29

## 2024-02-28 RX ORDER — KETOROLAC TROMETHAMINE 30 MG/ML
15 SYRINGE (ML) INJECTION EVERY 6 HOURS
Refills: 0 | Status: DISCONTINUED | OUTPATIENT
Start: 2024-02-28 | End: 2024-02-29

## 2024-02-28 RX ORDER — SODIUM CHLORIDE 9 MG/ML
1000 INJECTION INTRAMUSCULAR; INTRAVENOUS; SUBCUTANEOUS
Refills: 0 | Status: DISCONTINUED | OUTPATIENT
Start: 2024-02-28 | End: 2024-02-29

## 2024-02-28 RX ORDER — OXYCODONE HYDROCHLORIDE 5 MG/1
5 TABLET ORAL EVERY 8 HOURS
Refills: 0 | Status: DISCONTINUED | OUTPATIENT
Start: 2024-02-28 | End: 2024-02-29

## 2024-02-28 RX ORDER — LATANOPROST 0.05 MG/ML
1 SOLUTION/ DROPS OPHTHALMIC; TOPICAL
Refills: 0 | DISCHARGE

## 2024-02-28 RX ORDER — AMLODIPINE BESYLATE 2.5 MG/1
10 TABLET ORAL DAILY
Refills: 0 | Status: DISCONTINUED | OUTPATIENT
Start: 2024-02-28 | End: 2024-02-29

## 2024-02-28 RX ORDER — VANCOMYCIN HCL 1 G
1250 VIAL (EA) INTRAVENOUS ONCE
Refills: 0 | Status: COMPLETED | OUTPATIENT
Start: 2024-02-28 | End: 2024-02-28

## 2024-02-28 RX ORDER — ACETAMINOPHEN 500 MG
1000 TABLET ORAL ONCE
Refills: 0 | Status: COMPLETED | OUTPATIENT
Start: 2024-02-28 | End: 2024-02-28

## 2024-02-28 RX ORDER — CHLORHEXIDINE GLUCONATE 213 G/1000ML
1 SOLUTION TOPICAL
Refills: 0 | Status: DISCONTINUED | OUTPATIENT
Start: 2024-02-28 | End: 2024-02-29

## 2024-02-28 RX ORDER — CELECOXIB 200 MG/1
200 CAPSULE ORAL
Refills: 0 | Status: DISCONTINUED | OUTPATIENT
Start: 2024-02-29 | End: 2024-02-29

## 2024-02-28 RX ORDER — AMLODIPINE BESYLATE 2.5 MG/1
1 TABLET ORAL
Refills: 0 | DISCHARGE

## 2024-02-28 RX ORDER — MAGNESIUM HYDROXIDE 400 MG/1
30 TABLET, CHEWABLE ORAL DAILY
Refills: 0 | Status: DISCONTINUED | OUTPATIENT
Start: 2024-02-28 | End: 2024-02-29

## 2024-02-28 RX ADMIN — AMLODIPINE BESYLATE 10 MILLIGRAM(S): 2.5 TABLET ORAL at 14:08

## 2024-02-28 RX ADMIN — OXYCODONE HYDROCHLORIDE 5 MILLIGRAM(S): 5 TABLET ORAL at 14:35

## 2024-02-28 RX ADMIN — Medication 15 MILLIGRAM(S): at 23:59

## 2024-02-28 RX ADMIN — Medication 1000 MILLIGRAM(S): at 08:51

## 2024-02-28 RX ADMIN — Medication 650 MILLIGRAM(S): at 23:59

## 2024-02-28 RX ADMIN — CELECOXIB 200 MILLIGRAM(S): 200 CAPSULE ORAL at 08:52

## 2024-02-28 RX ADMIN — Medication 15 MILLIGRAM(S): at 17:43

## 2024-02-28 RX ADMIN — CELECOXIB 200 MILLIGRAM(S): 200 CAPSULE ORAL at 12:11

## 2024-02-28 RX ADMIN — OXYCODONE HYDROCHLORIDE 5 MILLIGRAM(S): 5 TABLET ORAL at 14:05

## 2024-02-28 RX ADMIN — FAMOTIDINE 40 MILLIGRAM(S): 10 INJECTION INTRAVENOUS at 17:24

## 2024-02-28 RX ADMIN — Medication 100 MILLIGRAM(S): at 14:08

## 2024-02-28 RX ADMIN — LATANOPROST 1 DROP(S): 0.05 SOLUTION/ DROPS OPHTHALMIC; TOPICAL at 22:11

## 2024-02-28 RX ADMIN — SODIUM CHLORIDE 100 MILLILITER(S): 9 INJECTION INTRAMUSCULAR; INTRAVENOUS; SUBCUTANEOUS at 12:11

## 2024-02-28 RX ADMIN — PANTOPRAZOLE SODIUM 40 MILLIGRAM(S): 20 TABLET, DELAYED RELEASE ORAL at 14:07

## 2024-02-28 RX ADMIN — Medication 100 MILLIGRAM(S): at 22:10

## 2024-02-28 RX ADMIN — Medication 650 MILLIGRAM(S): at 17:24

## 2024-02-28 RX ADMIN — Medication 166.67 MILLIGRAM(S): at 08:51

## 2024-02-28 RX ADMIN — Medication 15 MILLIGRAM(S): at 17:24

## 2024-02-28 RX ADMIN — Medication 650 MILLIGRAM(S): at 17:43

## 2024-02-28 RX ADMIN — Medication 1000 MILLIGRAM(S): at 12:11

## 2024-02-28 NOTE — PHYSICAL THERAPY INITIAL EVALUATION ADULT - LIVES WITH, PROFILE
Pt lives with wife in a private home with 4 steps to enter with a handle on one side and 1 flight of stairs inside with R handrail going to the down./spouse

## 2024-02-28 NOTE — DISCHARGE NOTE PROVIDER - NSDCCPCAREPLAN_GEN_ALL_CORE_FT
PRINCIPAL DISCHARGE DIAGNOSIS  Diagnosis: OA (osteoarthritis)  Assessment and Plan of Treatment:      PRINCIPAL DISCHARGE DIAGNOSIS  Diagnosis: Arthritis of left knee  Assessment and Plan of Treatment: Keep surgical site clean and dry, may remove dressing in 6  days . Call your surgeon if any wound drainage, redness , increasing pain, fevers over 101 or if you have any questions or concerns.  Ice pack to affected area q4-6h as needed   You may shower with the bandage on and once it is removed. Once it is removed  , do not scrub surgical site. Do not apply any lotions/moisturizers/creams to surgical site.  Call to make your  post op appointment if you do not have one already.

## 2024-02-28 NOTE — ASU PATIENT PROFILE, ADULT - NSICDXPASTMEDICALHX_GEN_ALL_CORE_FT
PAST MEDICAL HISTORY:  Asthma     COPD (chronic obstructive pulmonary disease)     Diverticulitis     GERD (gastroesophageal reflux disease)     H/O eye injury     History of glaucoma     HTN (hypertension)     Obesity with body mass index (BMI) of 30.0 to 39.9     Sleep apnea, obstructive

## 2024-02-28 NOTE — PATIENT PROFILE ADULT - COMPLETE THE FOLLOWING IF THE PATIENT REFUSES THE INFLUENZA VACCINE:
[employer] dose 1 and 2 Risks/benefits discussed with patient/surrogate/Vaccine Information Sheet (VIS) provided-VIS date: 8/6/21

## 2024-02-28 NOTE — DISCHARGE NOTE PROVIDER - HOSPITAL COURSE
routine post op course s/p Total Knee Arthroplasty 68 year old male who was admitted for an elective Total left Knee Arthroplasty. The patient tolerated surgery well with no intra/post operative complications. The patient received intra/post operative antibiotics for infection prophylaxis and will be discharged on Aspirin 81mg BID for 30 days to lower the risk of blood clots. The patient worked with Physical Therapy while admitted to the hospital and is stable for discharge.

## 2024-02-28 NOTE — ASU PATIENT PROFILE, ADULT - FALL HARM RISK - HARM RISK INTERVENTIONS

## 2024-02-28 NOTE — PHYSICAL THERAPY INITIAL EVALUATION ADULT - GENERAL OBSERVATIONS, REHAB EVAL
17:00-17:30. Chart reviewed; confirmed with RN to see the pt for PT. Pt ready for PT; received in bed with complain of 5/10 pain in L knee. +hep lock, +sequentials, +ace bandage L knee. Agreeable for PT evaluation.

## 2024-02-28 NOTE — PATIENT PROFILE ADULT - FUNCTIONAL ASSESSMENT - BASIC MOBILITY SECTION LABEL
. Protopic Pregnancy And Lactation Text: This medication is Pregnancy Category C. It is unknown if this medication is excreted in breast milk when applied topically.

## 2024-02-28 NOTE — DISCHARGE NOTE PROVIDER - NSDCFUSCHEDAPPT_GEN_ALL_CORE_FT
Endy Elliott  [facility] Physician [facility]  [address]  Scheduled Appointment: 03/21/2024

## 2024-02-28 NOTE — DISCHARGE NOTE PROVIDER - NSDCCPTREATMENT_GEN_ALL_CORE_FT
PRINCIPAL PROCEDURE  Procedure: TKA (total knee arthroplasty)  Findings and Treatment:      PRINCIPAL PROCEDURE  Procedure: Left total knee arthroplasty  Findings and Treatment:

## 2024-02-28 NOTE — PRE-ANESTHESIA EVALUATION ADULT - NSANTHPMHFT_GEN_ALL_CORE
PAT. CPAP in use Advised to bring it on the DOS   [facility] bilateral   Left Eye injury from childhood (nail poked in to left eye)  EKG SB WITH first degree AVB

## 2024-02-28 NOTE — BRIEF OPERATIVE NOTE - FIRST ASSIST
Diagnosis discussed with the patient in detail. Patient is 8 weeks post partum and did experience pre-eclampsia during pregnancy, but states that her blood pressure is not under control. Resident/Fellow

## 2024-02-28 NOTE — ASU PATIENT PROFILE, ADULT - NSICDXPASTSURGICALHX_GEN_ALL_CORE_FT
PAST SURGICAL HISTORY:  Glaucomatous flecks (subcapsular), left eye     History of total right hip replacement     S/P ACL surgery left knee

## 2024-02-28 NOTE — PHYSICAL THERAPY INITIAL EVALUATION ADULT - PERTINENT HX OF CURRENT PROBLEM, REHAB EVAL
Pt is a 69 y/o male with dx of elective L TKR with h/o L knee OA under regional anesthesia seen pod #0.

## 2024-02-28 NOTE — PATIENT PROFILE ADULT - HAVE YOU EXPERIENCED VIOLENCE OR A TRAUMATIC EVENT?
Berta Pesron, LAISHA............................ 2018 3:42 PM          
no

## 2024-02-28 NOTE — DISCHARGE NOTE PROVIDER - CARE PROVIDER_API CALL
Elpidio-Endy Dejesus  Orthopaedic Surgery  [address] [place] [address]  Phone: [phone]  Fax: [phone]  Established Patient  Follow Up Time: Routine

## 2024-02-28 NOTE — DISCHARGE NOTE PROVIDER - NSDCMRMEDTOKEN_GEN_ALL_CORE_FT
Allergy Relief Multi-Symptom oral tablet: 2 tab(s) orally once a day  amLODIPine 10 mg oral tablet: 1 tab(s) orally once a day  azelastine 137 mcg/inh (0.1%) nasal spray: 2 spray(s) intranasally once a day (at bedtime)  famotidine 40 mg oral tablet: 1 tab(s) orally once a day (at bedtime)  latanoprost 0.005% ophthalmic emulsion: 1 drop(s) in each affected eye once a day (at bedtime) left eye  Xopenex HFA 45 mcg/inh inhalation aerosol: 2 puff(s) inhaled once a day   acetaminophen 325 mg oral tablet: 2 tab(s) orally every 6 hours  Allergy Relief Multi-Symptom oral tablet: 2 tab(s) orally once a day  amLODIPine 10 mg oral tablet: 1 tab(s) orally once a day  azelastine 137 mcg/inh (0.1%) nasal spray: 2 spray(s) intranasally once a day (at bedtime)  famotidine 40 mg oral tablet: 1 tab(s) orally once a day (at bedtime)  ibuprofen 400 mg oral tablet: 1 tab(s) orally every 8 hours post op pain s/p Total Knee Arthroplasty  latanoprost 0.005% ophthalmic emulsion: 1 drop(s) in each affected eye once a day (at bedtime) left eye  naloxone 4 mg/0.1 mL nasal spray: 4 milligram(s) intranasally once as needed for OD take as directed in the event of accidental overdose  oxyCODONE 5 mg oral tablet: 1 tab(s) orally every 8 hours as needed for breakthrough pain post op pain s/p Total Knee Arthroplasty MDD: 3  polyethylene glycol 3350 oral powder for reconstitution: 17 gram(s) orally once a day (at bedtime) as needed for  constipation  senna leaf extract oral tablet: 2 tab(s) orally once a day (at bedtime)  traMADol 50 mg oral tablet: 1 tab(s) orally every 4 hours as needed for Mild Pain (1 - 3) post op pain s/p Total Knee Arthroplasty MDD: 6  Xopenex HFA 45 mcg/inh inhalation aerosol: 2 puff(s) inhaled once a day

## 2024-02-28 NOTE — PATIENT PROFILE ADULT - FALL HARM RISK - HARM RISK INTERVENTIONS
Assistance with ambulation/Assistance OOB with selected safe patient handling equipment/Communicate Risk of Fall with Harm to all staff/Discuss with provider need for PT consult/Monitor gait and stability/Provide patient with walking aids - walker, cane, crutches/Reinforce activity limits and safety measures with patient and family/Sit up slowly, dangle for a short time, stand at bedside before walking/Tailored Fall Risk Interventions/Use of alarms - bed, chair and/or voice tab/Visual Cue: Yellow wristband and red socks/Bed in lowest position, wheels locked, appropriate side rails in place/Call bell, personal items and telephone in reach/Instruct patient to call for assistance before getting out of bed or chair/Non-slip footwear when patient is out of bed/[place] to call system/Physically safe environment - no spills, clutter or unnecessary equipment/Purposeful Proactive Rounding/Room/bathroom lighting operational, light cord in reach

## 2024-02-29 ENCOUNTER — TRANSCRIPTION ENCOUNTER (OUTPATIENT)
Age: 69
End: 2024-02-29

## 2024-02-29 VITALS
DIASTOLIC BLOOD PRESSURE: 76 MMHG | OXYGEN SATURATION: 93 % | TEMPERATURE: 99 F | RESPIRATION RATE: 18 BRPM | SYSTOLIC BLOOD PRESSURE: 155 MMHG | HEART RATE: 70 BPM

## 2024-02-29 LAB
ANION GAP SERPL CALC-SCNC: 13 MMOL/L — SIGNIFICANT CHANGE UP (ref 7–14)
BUN SERPL-MCNC: 21 MG/DL — HIGH (ref 10–20)
CALCIUM SERPL-MCNC: 9.9 MG/DL — SIGNIFICANT CHANGE UP (ref 8.4–10.5)
CHLORIDE SERPL-SCNC: 100 MMOL/L — SIGNIFICANT CHANGE UP (ref 98–110)
CO2 SERPL-SCNC: 23 MMOL/L — SIGNIFICANT CHANGE UP (ref 17–32)
CREAT SERPL-MCNC: 1 MG/DL — SIGNIFICANT CHANGE UP (ref 0.7–1.5)
EGFR: 81 ML/MIN/1.73M2 — SIGNIFICANT CHANGE UP
GLUCOSE SERPL-MCNC: 216 MG/DL — HIGH (ref 70–99)
HCT VFR BLD CALC: 37.2 % — LOW (ref 42–52)
HGB BLD-MCNC: 13.4 G/DL — LOW (ref 14–18)
MCHC RBC-ENTMCNC: 32.1 PG — HIGH (ref 27–31)
MCHC RBC-ENTMCNC: 36 G/DL — SIGNIFICANT CHANGE UP (ref 32–37)
MCV RBC AUTO: 89.2 FL — SIGNIFICANT CHANGE UP (ref 80–94)
NRBC # BLD: 0 /100 WBCS — SIGNIFICANT CHANGE UP (ref 0–0)
PLATELET # BLD AUTO: 267 K/UL — SIGNIFICANT CHANGE UP (ref 130–400)
PMV BLD: 10.7 FL — HIGH (ref 7.4–10.4)
POTASSIUM SERPL-MCNC: 4.6 MMOL/L — SIGNIFICANT CHANGE UP (ref 3.5–5)
POTASSIUM SERPL-SCNC: 4.6 MMOL/L — SIGNIFICANT CHANGE UP (ref 3.5–5)
RBC # BLD: 4.17 M/UL — LOW (ref 4.7–6.1)
RBC # FLD: 11.9 % — SIGNIFICANT CHANGE UP (ref 11.5–14.5)
SODIUM SERPL-SCNC: 136 MMOL/L — SIGNIFICANT CHANGE UP (ref 135–146)
WBC # BLD: 12.92 K/UL — HIGH (ref 4.8–10.8)
WBC # FLD AUTO: 12.92 K/UL — HIGH (ref 4.8–10.8)

## 2024-02-29 PROCEDURE — 99232 SBSQ HOSP IP/OBS MODERATE 35: CPT

## 2024-02-29 RX ORDER — IBUPROFEN 200 MG
1 TABLET ORAL
Qty: 42 | Refills: 0
Start: 2024-02-29 | End: 2024-03-13

## 2024-02-29 RX ORDER — OXYCODONE HYDROCHLORIDE 5 MG/1
1 TABLET ORAL
Qty: 9 | Refills: 0
Start: 2024-02-29 | End: 2024-03-02

## 2024-02-29 RX ORDER — POLYETHYLENE GLYCOL 3350 17 G/17G
17 POWDER, FOR SOLUTION ORAL
Qty: 0 | Refills: 0 | DISCHARGE
Start: 2024-02-29

## 2024-02-29 RX ORDER — TRAMADOL HYDROCHLORIDE 50 MG/1
1 TABLET ORAL
Qty: 42 | Refills: 0
Start: 2024-02-29 | End: 2024-03-06

## 2024-02-29 RX ORDER — SENNA PLUS 8.6 MG/1
2 TABLET ORAL
Qty: 0 | Refills: 0 | DISCHARGE
Start: 2024-02-29

## 2024-02-29 RX ORDER — ACETAMINOPHEN 500 MG
2 TABLET ORAL
Qty: 0 | Refills: 0 | DISCHARGE
Start: 2024-02-29 | End: 2024-03-13

## 2024-02-29 RX ORDER — NALOXONE HYDROCHLORIDE 4 MG/.1ML
4 SPRAY NASAL
Qty: 1 | Refills: 0
Start: 2024-02-29

## 2024-02-29 RX ADMIN — Medication 4 MILLIGRAM(S): at 05:42

## 2024-02-29 RX ADMIN — Medication 650 MILLIGRAM(S): at 05:42

## 2024-02-29 RX ADMIN — Medication 650 MILLIGRAM(S): at 12:29

## 2024-02-29 RX ADMIN — Medication 15 MILLIGRAM(S): at 07:46

## 2024-02-29 RX ADMIN — Medication 650 MILLIGRAM(S): at 07:46

## 2024-02-29 RX ADMIN — TRAMADOL HYDROCHLORIDE 50 MILLIGRAM(S): 50 TABLET ORAL at 07:58

## 2024-02-29 RX ADMIN — Medication 15 MILLIGRAM(S): at 11:25

## 2024-02-29 RX ADMIN — Medication 15 MILLIGRAM(S): at 12:29

## 2024-02-29 RX ADMIN — PANTOPRAZOLE SODIUM 40 MILLIGRAM(S): 20 TABLET, DELAYED RELEASE ORAL at 05:43

## 2024-02-29 RX ADMIN — Medication 15 MILLIGRAM(S): at 00:30

## 2024-02-29 RX ADMIN — TRAMADOL HYDROCHLORIDE 50 MILLIGRAM(S): 50 TABLET ORAL at 08:28

## 2024-02-29 RX ADMIN — AMLODIPINE BESYLATE 10 MILLIGRAM(S): 2.5 TABLET ORAL at 05:42

## 2024-02-29 RX ADMIN — Medication 15 MILLIGRAM(S): at 05:41

## 2024-02-29 RX ADMIN — FAMOTIDINE 40 MILLIGRAM(S): 10 INJECTION INTRAVENOUS at 05:42

## 2024-02-29 RX ADMIN — Medication 650 MILLIGRAM(S): at 00:30

## 2024-02-29 RX ADMIN — Medication 650 MILLIGRAM(S): at 11:24

## 2024-02-29 NOTE — OCCUPATIONAL THERAPY INITIAL EVALUATION ADULT - GENERAL OBSERVATIONS, REHAB EVAL
9:45-10:15; chart reviewed, ok to treat by Occupational Therapist as confirmed by RN Hannah, Pt received seated in bedside chair +aquacel left knee +acewrap left knee +cold application left knee +RUE heplock with wife present in NAD. Discharge noted aquacel left knee; RN aware. Pt reported 8/10 left knee pain; RN aware. Pt in agreement with OT IE.

## 2024-02-29 NOTE — OCCUPATIONAL THERAPY INITIAL EVALUATION ADULT - LIVES WITH, PROFILE
wife in a private home +4 steps to enter with left handrail +1 flight of steps with right handrail to basement +walk-in-shower +grab bar +low toilet with grab bar/spouse

## 2024-02-29 NOTE — DISCHARGE NOTE NURSING/CASE MANAGEMENT/SOCIAL WORK - PATIENT PORTAL LINK FT
You can access the FollowMyHealth Patient Portal offered by [facility] by registering at the following website: [email or web address]. By joining [employer]’s FollowMyHealth portal, you will also be able to view your health information using other applications (apps) compatible with our system.

## 2024-02-29 NOTE — PROGRESS NOTE ADULT - SUBJECTIVE AND OBJECTIVE BOX
left Total Knee Arthroplasty   68y Male POD #  1    S/P     Patient seen and examined at bedside . The patient is awake and alert in NAD. No complaints of chest pain, SOB, N/V.  Pain is controlled, the patient has ambulated and is voiding.     PAST MEDICAL & SURGICAL HISTORY:  Diverticulitis    Asthma    COPD (chronic obstructive pulmonary disease)    HTN (hypertension)    History of glaucoma    GERD (gastroesophageal reflux disease)    H/O eye injury    Obesity with body mass index (BMI) of 30.0 to 39.9    Sleep apnea, obstructive    History of total right hip replacement    Glaucomatous flecks (subcapsular), left eye    S/P ACL surgery  left knee          MEDICATIONS  (STANDING):  acetaminophen     Tablet .. 650 milliGRAM(s) Oral every 6 hours  amLODIPine   Tablet 10 milliGRAM(s) Oral daily  celecoxib 200 milliGRAM(s) Oral two times a day  chlorhexidine 2% Cloths 1 Application(s) Topical <User Schedule>  famotidine    Tablet 40 milliGRAM(s) Oral two times a day  ketorolac   Injectable 15 milliGRAM(s) IV Push every 6 hours  latanoprost 0.005% Ophthalmic Solution 1 Drop(s) Both EYES at bedtime  pantoprazole    Tablet 40 milliGRAM(s) Oral before breakfast  polyethylene glycol 3350 17 Gram(s) Oral at bedtime  senna 2 Tablet(s) Oral at bedtime    MEDICATIONS  (PRN):  aluminum hydroxide/magnesium hydroxide/simethicone Suspension 30 milliLiter(s) Oral four times a day PRN Indigestion  magnesium hydroxide Suspension 30 milliLiter(s) Oral daily PRN Constipation  ondansetron Injectable 4 milliGRAM(s) IV Push every 6 hours PRN Nausea and/or Vomiting  oxyCODONE    IR 5 milliGRAM(s) Oral every 8 hours PRN breakthrough  traMADol 50 milliGRAM(s) Oral every 4 hours PRN Mild Pain (1 - 3)        Vital Signs Last 24 Hrs  T(C): 37.2 (29 Feb 2024 04:00), Max: 37.2 (29 Feb 2024 04:00)  T(F): 98.9 (29 Feb 2024 04:00), Max: 98.9 (29 Feb 2024 04:00)  HR: 70 (29 Feb 2024 04:00) (54 - 70)  BP: 155/76 (29 Feb 2024 04:00) (110/58 - 166/77)  BP(mean): --  RR: 18 (29 Feb 2024 04:00) (16 - 18)  SpO2: 93% (29 Feb 2024 04:00) (90% - 96%)                      PE:  The patient was seen and examined at bedside          A&OX3, NAD          left knee Aquacel  dressing with drainage         Compartments soft, BLE SCD in place          NVI, SILT           A/P:     # POD # 1      s/p left Total Knee Arthroplasty                 - OOB to Chair   -PT/OT - wbat  - dsg change  -post op abx   -Pain control - per pain protocol   -Incentive Spirometry   -DVT Prophylaxis - aspirin   -GI ppx- continue Protonix   -f/u am labs   -discharge planning- home with home care                              left Total Knee Arthroplasty   68y Male POD #  1    S/P     Patient seen and examined at bedside . The patient is awake and alert in NAD. No complaints of chest pain, SOB, N/V.  Pain is controlled, the patient has ambulated and is voiding.     PAST MEDICAL & SURGICAL HISTORY:  Diverticulitis    Asthma    COPD (chronic obstructive pulmonary disease)    HTN (hypertension)    History of glaucoma    GERD (gastroesophageal reflux disease)    H/O eye injury    Obesity with body mass index (BMI) of 30.0 to 39.9    Sleep apnea, obstructive    History of total right hip replacement    Glaucomatous flecks (subcapsular), left eye    S/P ACL surgery  left knee          MEDICATIONS  (STANDING):  acetaminophen     Tablet .. 650 milliGRAM(s) Oral every 6 hours  amLODIPine   Tablet 10 milliGRAM(s) Oral daily  celecoxib 200 milliGRAM(s) Oral two times a day  chlorhexidine 2% Cloths 1 Application(s) Topical <User Schedule>  famotidine    Tablet 40 milliGRAM(s) Oral two times a day  ketorolac   Injectable 15 milliGRAM(s) IV Push every 6 hours  latanoprost 0.005% Ophthalmic Solution 1 Drop(s) Both EYES at bedtime  pantoprazole    Tablet 40 milliGRAM(s) Oral before breakfast  polyethylene glycol 3350 17 Gram(s) Oral at bedtime  senna 2 Tablet(s) Oral at bedtime    MEDICATIONS  (PRN):  aluminum hydroxide/magnesium hydroxide/simethicone Suspension 30 milliLiter(s) Oral four times a day PRN Indigestion  magnesium hydroxide Suspension 30 milliLiter(s) Oral daily PRN Constipation  ondansetron Injectable 4 milliGRAM(s) IV Push every 6 hours PRN Nausea and/or Vomiting  oxyCODONE    IR 5 milliGRAM(s) Oral every 8 hours PRN breakthrough  traMADol 50 milliGRAM(s) Oral every 4 hours PRN Mild Pain (1 - 3)        Vital Signs Last 24 Hrs  T(C): 37.2 (29 Feb 2024 04:00), Max: 37.2 (29 Feb 2024 04:00)  T(F): 98.9 (29 Feb 2024 04:00), Max: 98.9 (29 Feb 2024 04:00)  HR: 70 (29 Feb 2024 04:00) (54 - 70)  BP: 155/76 (29 Feb 2024 04:00) (110/58 - 166/77)  BP(mean): --  RR: 18 (29 Feb 2024 04:00) (16 - 18)  SpO2: 93% (29 Feb 2024 04:00) (90% - 96%)                      PE:  The patient was seen and examined at bedside          A&OX3, NAD          left knee Aquacel  dressing with drainage         Compartments soft, BLE SCD in place          NVI, SILT           A/P:     # POD # 1      s/p left Total Knee Arthroplasty                 - OOB to Chair   -PT/OT - wbat  - dsg change  -post op abx   -Pain control - per pain protocol   -Incentive Spirometry   -DVT Prophylaxis - aspirin   -GI ppx- continue Protonix   -f/u am labs   -discharge planning- home with home care          # post op urinary retention s/p straight cath x1  -resolved

## 2024-02-29 NOTE — CONSULT NOTE ADULT - ASSESSMENT
Pre-Op Diagnosis	PRE-OP DIAGNOSIS:  Osteoarthritis of left knee 28-Feb-2024 11:42:14  Endy Elliott  Post-Op Diagnosis	POST-OP DIAGNOSIS:  Osteoarthritis of left knee 28-Feb-2024 11:42:20  Endy Elliott  Procedure	PROCEDURES:  Total knee replacement 28-Feb-2024 11:42:03  Endy Elliott  Operative Findings	degenerative changes of knee  pre op flexion contracture 10 deg  Evidence of infection or abscess identified at the start or during the surgical procedure:	No  Specimens	bone michael  Estimated Blood Loss	100 milliLiter(s)  Antibiotic Protocol	Followed protocol  Venous Thromboembolism Prophylaxis Therapy	ANIYAH stockings, SCD sleeve  POD#1  pain control   DVT prophyaxsis asa q12   PT/OT  IS  Bowel Regimen  Ortho follow up     #HTn - bp stable  #copd - no sob - cont home inhlaers  # PAT - cpap?  #glaucoma - cont home drops    recall prn  Pre-Op Diagnosis	PRE-OP DIAGNOSIS:  Osteoarthritis of left knee 28-Feb-2024 11:42:14  Endy Elliott  Post-Op Diagnosis	POST-OP DIAGNOSIS:  Osteoarthritis of left knee 28-Feb-2024 11:42:20  Endy Elliott  Procedure	PROCEDURES:  Total knee replacement 28-Feb-2024 11:42:03  Endy Elliott  Operative Findings	degenerative changes of knee  pre op flexion contracture 10 deg  Evidence of infection or abscess identified at the start or during the surgical procedure:	No  Specimens	bone michael  Estimated Blood Loss	100 milliLiter(s)  Antibiotic Protocol	Followed protocol  Venous Thromboembolism Prophylaxis Therapy	ANIYAH stockings, SCD sleeve  POD#1  pain control   DVT prophyaxsis asa q12   PT/OT  IS  Bowel Regimen  Ortho follow up     #HTn - bp stable  #copd - no sob - cont home inhlaers  # PAT - cpap at home   #glaucoma - cont home drops    recall prn

## 2024-02-29 NOTE — CHART NOTE - NSCHARTNOTEFT_GEN_A_CORE
The patient is post op from a total knee replacement and requires a 3 in 1 commode as they have no access to a bathroom on the floor they will be residing. The patient requires a rolling walker due to a mobility deficient that can be resolved with the use of a walker. The patient is able to safely use the walker.
PACU ANESTHESIA ADMISSION NOTE      Procedure: Total knee replacement      Post op diagnosis:  Osteoarthritis of left knee        ____  Intubated  TV:______       Rate: ______      FiO2: ______    __x__  Patent Airway    __x__  Full return of protective reflexes    ____  Full recovery from anesthesia / back to baseline     Vitals:   T:  97.6         R:    18              BP: 135/63                 Sat:   95                P: 64      Mental Status:  _x___ Awake   __x___ Alert   _____ Drowsy   _____ Sedated    Nausea/Vomiting:  __x__ NO  ______Yes,   See Post - Op Orders          Pain Scale (0-10):  __0___    Treatment: ____ None    ____ See Post - Op/PCA Orders    Post - Operative Fluids:   ____ Oral   __x__ See Post - Op Orders    Plan: Discharge:   ____Home       __x___Floor     _____Critical Care    _____  Other:_________________    Comments:

## 2024-02-29 NOTE — OCCUPATIONAL THERAPY INITIAL EVALUATION ADULT - PHYSICAL ASSIST/NONPHYSICAL ASSIST: SHOWER, REHAB EVAL
Chief Complaint   Patient presents with   • Chest Pain Adult        HPI:   This very nice 49-year-old female with a history of hyperlipidemia, mitral valve prolapse, ulcerative colitis that is in control, sleep apnea, family history of heart disease with mom passing away at 70 years old from heart attack, dad with high blood pressure and high cholesterol, presents today with sudden onset of chest tightness radiating to right jaw feeling a little sweaty but no associated shortness of breath or nausea or dizziness.  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  Patient states now she just has a tiny bit of jaw discomfort and tightness but nothing like earlier this morning.  Patient is fully vaccinated for COVID-19.  She said no COVID-19 illness in the past.  Patient states she has had a cardiac stress test a few years ago just routine that was normal.  She does take a daily baby aspirin a day and cholesterol medicine as well as Lexapro.  She states she has never had this in the past.    Pt was interviewed while wearing a protective gown, gloves, and mask. I discussed the risk factors for COVID-19, such are recent travel or contact with ill individuals. Pt denies both any recent travel or exposure to ill individuals.         ROS     Constitutional: No fever, fatigue or weight loss.  Skin: No rash  Eyes: No recent vision problems or eye pain  ENT: No congestion, ear pain, or sore throat  Cardiovascular: Yes chest pain  Respiratory: No cough, shortness of breath, or wheezing.  Gastrointestinal: No abdominal pain, nausea, vomiting, or diarrhea  Genitourinary: No dysuria  Musculoskeletal: No joint swelling  Neurologic: No headache.  Hematologic: No unusual bruising or bleeding.  Otherwise 10-point review of systems reviewed and otherwise negative.        Patient Active Problem List   Diagnosis   • Episodic mood disorder (CMS/HCC)   • HDL deficiency   •  Hyperlipidemia   • Menorrhagia   • Migraine   • ELYSIA on CPAP   • Personal history of malignant phylloides tumor of breast   • Plantar fasciitis, right   • Ulcerative colitis (CMS/HCC)   • Vitamin D deficiency   • Women's annual routine gynecological examination        PAST MEDICAL HX:  Sleep apnea on CPAP, hyperlipidemia, obesity, mitral valve prolapse, ulcerative colitis    PAST SURGICAL HX:  No past surgical history related to present illness    Social History     Tobacco Use   • Smoking status: Not on file   Substance Use Topics   • Alcohol use: Not on file   • Drug use: Not on file        ED Triage Vitals [09/13/21 0801]   BP (!) 152/100   Heart Rate 66   Resp 18   Temp    SpO2 97 %        Physical Exam  Vitals and nursing note reviewed.   Constitutional:       General: She is not in acute distress.     Appearance: Normal appearance. She is obese. She is not ill-appearing.   HENT:      Head: Normocephalic and atraumatic.   Eyes:      General: No scleral icterus.     Conjunctiva/sclera: Conjunctivae normal.      Pupils: Pupils are equal, round, and reactive to light.   Neck:      Vascular: No JVD.   Cardiovascular:      Rate and Rhythm: Normal rate and regular rhythm.      Pulses: Normal pulses.      Heart sounds: Normal heart sounds. No murmur heard.     Pulmonary:      Effort: Pulmonary effort is normal. No respiratory distress.      Breath sounds: Normal breath sounds.   Chest:      Chest wall: No tenderness.   Abdominal:      General: Bowel sounds are normal. There is no distension.      Palpations: Abdomen is soft. There is no mass.      Tenderness: There is no abdominal tenderness. There is no guarding or rebound.   Musculoskeletal:         General: No tenderness. Normal range of motion.      Cervical back: Neck supple.   Lymphadenopathy:      Cervical: No cervical adenopathy.   Skin:     General: Skin is warm and dry.      Coloration: Skin is not pale.      Findings: No erythema or rash.   Neurological:       Mental Status: She is alert and oriented to person, place, and time.      Cranial Nerves: No cranial nerve deficit.      Coordination: Coordination normal.      Gait: Gait normal.   Psychiatric:         Mood and Affect: Mood and affect normal.         Judgment: Judgment normal.              Results for orders placed or performed during the hospital encounter of 09/13/21   Prothrombin Time   Result Value    Prothrombin Time 10.2    INR 1.0     Comment: INR Therapeutic Range: 2.0 to 3.0 (2.5 to 3.5 recommended for recurrent thrombotic episodes and mechanical prosthetic heart valves.)   Partial Thromboplastin Time   Result Value    PTT 26   Comprehensive Metabolic Panel   Result Value    Fasting Status     Sodium 140    Potassium 4.1     Comment: Slight hemolysis, result may be falsely increased.    Chloride 106    Carbon Dioxide 26    Anion Gap 12    Glucose 92    BUN 13    Creatinine 0.69    Glomerular Filtration Rate >90     Comment: eGFR results = or >60 mL/min/1.73m2 = Normal kidney function. Estimated GFR calculated using the 2009 CKD-EPI creatinine equation.      BUN/ Creatinine Ratio 19    Calcium 8.6    Bilirubin, Total 0.6    GOT/AST 21    GPT/ALT 29    Alkaline Phosphatase 59    Albumin 3.8    Protein, Total 6.9    Globulin 3.1    A/G Ratio 1.2   Magnesium   Result Value    Magnesium 1.9   Troponin I Ultra Sensitive   Result Value    Troponin I, Ultra Sensitive <0.02   CBC with Automated Differential (performable only)   Result Value    WBC 6.3    RBC 4.63    HGB 13.5    HCT 40.9    MCV 88.3    MCH 29.2    MCHC 33.0    RDW-CV 12.1    RDW-SD 38.8 (L)        NRBC 0    Neutrophil, Percent 66    Lymphocytes, Percent 24    Mono, Percent 5    Eosinophils, Percent 4    Basophils, Percent 1    Immature Granulocytes 0    Absolute Neutrophils 4.2    Absolute Lymphocytes 1.5    Absolute Monocytes 0.3    Absolute Eosinophils  0.2    Absolute Basophils 0.0    Absolute Immmature Granulocytes 0.0         Imaging  Results          XR CHEST PA OR AP 1 VIEW (In process)                  Vitals:    09/13/21 0800 09/13/21 0801 09/13/21 0815 09/13/21 0830   BP: (!) 152/100 (!) 152/100 (!) 149/86 (!) 132/99   Pulse: 68 66 66 77   Resp: (!) 21 18 15 (!) 22   SpO2: 96% 97% 96% 96%   Weight:  97.5 kg (215 lb)             ED Medication Orders (From admission, onward)    Ordered Start     Status Ordering Provider    09/13/21 0823 09/13/21 0824  aspirin chewable 243 mg  ONCE         Last MAR action: Given ROCIO MORAN             EKG:   ED interpretation at 8 AM.  Normal sinus rhythm with a ventricular rate of 71, SD interval of 202, QRS duration of 84 and axis of 39.  No acute ST segment changes.        MDM:    Based on my history, physical exam, and diagnostic evaluation, the patient presents to the emergency department with chest pain suggestive of ischemia. The pt has cardiac risk factors for CAD. Their physical exam was unremarkable including normal chest and respiratory exam. The diagnostic evaluation including laboratory data, EKG, and x-ray was non-diagnostic. Based on this evaluation, I do believe the symptoms atypical and mildly concerning for acute ischemic chest pain. I do not believe this is a vascular catastrophe such as a dissection or an acute rupture of an AAA. The pt is low risk for acute thrombo-embolic phenomena. I discussed the case with the admitting attendings.       ED Course as of Sep 13 0854   Mon Sep 13, 2021   0849 Discussed case with Best practice hospitalist, Iesha Henderson NP.  Agrees with plan for admission.    [EH]      ED Course User Index  [EH] Rocio Moran DO        ED Diagnosis        Final diagnosis    Chest pain with moderate risk for cardiac etiology                        Disposition: Admit to CDU                             Rocio Moran DO  09/13/21 9169     for hand placement, sequencing, and safety/verbal cues/1 person assist

## 2024-02-29 NOTE — CONSULT NOTE ADULT - SUBJECTIVE AND OBJECTIVE BOX
DILIA, FATOU  68y, Male  Allergy: No Known Allergies      CHIEF COMPLAINT: Total Knee Arthroplasty (29 Feb 2024 08:15)      HPI:    HPI:    FAMILY HISTORY:  FH: HTN (hypertension) (Sibling)      PAST MEDICAL & SURGICAL HISTORY:  Diverticulitis      Asthma      COPD (chronic obstructive pulmonary disease)      HTN (hypertension)      History of glaucoma      GERD (gastroesophageal reflux disease)      H/O eye injury      Obesity with body mass index (BMI) of 30.0 to 39.9      Sleep apnea, obstructive      History of total right hip replacement      Glaucomatous flecks (subcapsular), left eye      S/P ACL surgery  left knee          SOCIAL HISTORY  Social History:      Home Medications:  acetaminophen 325 mg oral tablet: 2 tab(s) orally every 6 hours (29 Feb 2024 09:04)  Allergy Relief Multi-Symptom oral tablet: 2 tab(s) orally once a day (28 Feb 2024 09:21)  amLODIPine 10 mg oral tablet: 1 tab(s) orally once a day (28 Feb 2024 09:21)  azelastine 137 mcg/inh (0.1%) nasal spray: 2 spray(s) intranasally once a day (at bedtime) (28 Feb 2024 09:21)  famotidine 40 mg oral tablet: 1 tab(s) orally once a day (at bedtime) (28 Feb 2024 09:21)  latanoprost 0.005% ophthalmic emulsion: 1 drop(s) in each affected eye once a day (at bedtime) left eye (28 Feb 2024 09:21)  polyethylene glycol 3350 oral powder for reconstitution: 17 gram(s) orally once a day (at bedtime) as needed for  constipation (29 Feb 2024 09:04)  senna leaf extract oral tablet: 2 tab(s) orally once a day (at bedtime) (29 Feb 2024 09:04)  Xopenex HFA 45 mcg/inh inhalation aerosol: 2 puff(s) inhaled once a day (28 Feb 2024 09:21)      ROS  General: Denies fevers, chills, nightsweats, weight loss  HEENT: Denies headache, rhinorrhea, sore throat, eye pain  CV: Denies CP, palpitations  PULM: Denies SOB, cough  GI: Denies abdominal pain, diarrhea  : Denies dysuria, hematuria  MSK: Denies arthralgias  SKIN: Denies rash   NEURO: Denies paresthesias, weakness  PSYCH: Denies depression    VITALS:  T(F): 98.9, Max: 98.9 (02-29-24 @ 04:00)  HR: 70  BP: 155/76  RR: 18Vital Signs Last 24 Hrs  T(C): 37.2 (29 Feb 2024 04:00), Max: 37.2 (29 Feb 2024 04:00)  T(F): 98.9 (29 Feb 2024 04:00), Max: 98.9 (29 Feb 2024 04:00)  HR: 70 (29 Feb 2024 04:00) (54 - 70)  BP: 155/76 (29 Feb 2024 04:00) (110/58 - 164/71)  BP(mean): --  RR: 18 (29 Feb 2024 04:00) (16 - 18)  SpO2: 93% (29 Feb 2024 04:00) (90% - 95%)    Parameters below as of 29 Feb 2024 04:00  Patient On (Oxygen Delivery Method): room air        PHYSICAL EXAM:  Gen: NAD, resting in bed  HEENT: Normocephalic, atraumatic  Neck: supple, no lymphadenopathy  CV: Regular rate & regular rhythm  Lungs: CTABL no wheeze  Abdomen: Soft, NTND+ BS present  Ext: Warm, well perfused no CCE  Neuro: non focal, awake, CN II-XII intact   Skin: no rash, no erythema  Psych: no SI, HI, Hallucination     TESTS & MEASUREMENTS:                        13.4   12.92 )-----------( 267      ( 29 Feb 2024 08:14 )             37.2     02-29    136  |  100  |  21<H>  ----------------------------<  216<H>  4.6   |  23  |  1.0    Ca    9.9      29 Feb 2024 08:14          Urinalysis Basic - ( 29 Feb 2024 08:14 )    Color: x / Appearance: x / SG: x / pH: x  Gluc: 216 mg/dL / Ketone: x  / Bili: x / Urobili: x   Blood: x / Protein: x / Nitrite: x   Leuk Esterase: x / RBC: x / WBC x   Sq Epi: x / Non Sq Epi: x / Bacteria: x            QRS axis to [] ° and NSR at a rate of [] BPM. There was no atrial enlargement. There was no ventricular hypertrophy. There were no ST-T changes and all intervals were normal.      INFECTIOUS DISEASES TESTING  MRSA PCR Result.: Positive (02-13-24 @ 13:30)      RADIOLOGY & ADDITIONAL TESTS:  I have personally reviewed the last Chest xray  CXR      CT      CARDIOLOGY TESTING      MEDICATIONS  (STANDING):  acetaminophen     Tablet .. 650 milliGRAM(s) Oral every 6 hours  amLODIPine   Tablet 10 milliGRAM(s) Oral daily  celecoxib 200 milliGRAM(s) Oral two times a day  chlorhexidine 2% Cloths 1 Application(s) Topical <User Schedule>  famotidine    Tablet 40 milliGRAM(s) Oral two times a day  latanoprost 0.005% Ophthalmic Solution 1 Drop(s) Both EYES at bedtime  pantoprazole    Tablet 40 milliGRAM(s) Oral before breakfast  polyethylene glycol 3350 17 Gram(s) Oral at bedtime  senna 2 Tablet(s) Oral at bedtime    MEDICATIONS  (PRN):  aluminum hydroxide/magnesium hydroxide/simethicone Suspension 30 milliLiter(s) Oral four times a day PRN Indigestion  magnesium hydroxide Suspension 30 milliLiter(s) Oral daily PRN Constipation  ondansetron Injectable 4 milliGRAM(s) IV Push every 6 hours PRN Nausea and/or Vomiting  oxyCODONE    IR 5 milliGRAM(s) Oral every 8 hours PRN breakthrough  traMADol 50 milliGRAM(s) Oral every 4 hours PRN Mild Pain (1 - 3)      ANTIBIOTICS:      All available historical data has been reviewed    ASSESSMENT  68y M admitted with Primary osteoarthritis of left knee        PROBLEMS

## 2024-03-01 ENCOUNTER — TRANSCRIPTION ENCOUNTER (OUTPATIENT)
Age: 69
End: 2024-03-01

## 2024-03-05 LAB — SURGICAL PATHOLOGY STUDY: SIGNIFICANT CHANGE UP

## 2024-03-07 DIAGNOSIS — Z96.641 PRESENCE OF RIGHT ARTIFICIAL HIP JOINT: ICD-10-CM

## 2024-03-07 DIAGNOSIS — I10 ESSENTIAL (PRIMARY) HYPERTENSION: ICD-10-CM

## 2024-03-07 DIAGNOSIS — I44.0 ATRIOVENTRICULAR BLOCK, FIRST DEGREE: ICD-10-CM

## 2024-03-07 DIAGNOSIS — K21.9 GASTRO-ESOPHAGEAL REFLUX DISEASE WITHOUT ESOPHAGITIS: ICD-10-CM

## 2024-03-07 DIAGNOSIS — J44.9 CHRONIC OBSTRUCTIVE PULMONARY DISEASE, UNSPECIFIED: ICD-10-CM

## 2024-03-07 DIAGNOSIS — Z99.89 DEPENDENCE ON OTHER ENABLING MACHINES AND DEVICES: ICD-10-CM

## 2024-03-07 DIAGNOSIS — E66.9 OBESITY, UNSPECIFIED: ICD-10-CM

## 2024-03-07 DIAGNOSIS — M17.12 UNILATERAL PRIMARY OSTEOARTHRITIS, LEFT KNEE: ICD-10-CM

## 2024-03-07 DIAGNOSIS — R33.8 OTHER RETENTION OF URINE: ICD-10-CM

## 2024-03-07 DIAGNOSIS — G47.33 OBSTRUCTIVE SLEEP APNEA (ADULT) (PEDIATRIC): ICD-10-CM

## 2024-03-07 DIAGNOSIS — H40.9 UNSPECIFIED GLAUCOMA: ICD-10-CM

## 2024-03-21 ENCOUNTER — APPOINTMENT (OUTPATIENT)
Dept: ORTHOPEDIC SURGERY | Facility: CLINIC | Age: 69
End: 2024-03-21
Payer: MEDICARE

## 2024-03-21 ENCOUNTER — RESULT CHARGE (OUTPATIENT)
Age: 69
End: 2024-03-21

## 2024-03-21 PROBLEM — E66.9 OBESITY, UNSPECIFIED: Chronic | Status: ACTIVE | Noted: 2024-02-21

## 2024-03-21 PROBLEM — Z86.69 PERSONAL HISTORY OF OTHER DISEASES OF THE NERVOUS SYSTEM AND SENSE ORGANS: Chronic | Status: ACTIVE | Noted: 2024-02-13

## 2024-03-21 PROBLEM — J44.9 CHRONIC OBSTRUCTIVE PULMONARY DISEASE, UNSPECIFIED: Chronic | Status: ACTIVE | Noted: 2024-02-13

## 2024-03-21 PROBLEM — G47.33 OBSTRUCTIVE SLEEP APNEA (ADULT) (PEDIATRIC): Chronic | Status: ACTIVE | Noted: 2024-02-28

## 2024-03-21 PROBLEM — I10 ESSENTIAL (PRIMARY) HYPERTENSION: Chronic | Status: ACTIVE | Noted: 2024-02-13

## 2024-03-21 PROBLEM — K21.9 GASTRO-ESOPHAGEAL REFLUX DISEASE WITHOUT ESOPHAGITIS: Chronic | Status: ACTIVE | Noted: 2024-02-13

## 2024-03-21 PROBLEM — Z87.828 PERSONAL HISTORY OF OTHER (HEALED) PHYSICAL INJURY AND TRAUMA: Chronic | Status: ACTIVE | Noted: 2024-02-13

## 2024-03-21 PROCEDURE — 73560 X-RAY EXAM OF KNEE 1 OR 2: CPT | Mod: 50

## 2024-03-21 PROCEDURE — 99024 POSTOP FOLLOW-UP VISIT: CPT

## 2024-03-23 NOTE — HISTORY OF PRESENT ILLNESS
[de-identified] : s/p Left TKA doing well postop course uncomplicated, home PT complete, progressing appropriately, now ready for OP PT.  pain controlled (-)n/v/cp/sob  Left knee exam shows well healed incision NTTP AROM 2-90 negative grant  Imaging:  AP and Lateral views were obtained of the knees, including the contralateral knee for comparison. X-rays are negative for acute bone or soft tissue trauma. Left knee replacement in good alignment without radiographic evidence of complication.  Plan: continue home exercise activities as tolerated OP PT continue dvt prophylaxis f/u at 6 weeks.

## 2024-04-16 NOTE — ED ADULT NURSE NOTE - HOW OFTEN DO YOU HAVE A DRINK CONTAINING ALCOHOL?
Doing well.  Denies concerns today.  Baby active.  No cramping or bleeding.  Discussed and ordered 28 week labs.  Plan Tdap.  Return in 4 week.s   
Never

## 2024-04-29 ENCOUNTER — TRANSCRIPTION ENCOUNTER (OUTPATIENT)
Age: 69
End: 2024-04-29

## 2024-05-02 ENCOUNTER — APPOINTMENT (OUTPATIENT)
Dept: ORTHOPEDIC SURGERY | Facility: CLINIC | Age: 69
End: 2024-05-02
Payer: MEDICARE

## 2024-05-02 DIAGNOSIS — Z96.652 PRESENCE OF LEFT ARTIFICIAL KNEE JOINT: ICD-10-CM

## 2024-05-02 PROCEDURE — 99024 POSTOP FOLLOW-UP VISIT: CPT

## 2024-05-04 PROBLEM — Z96.652 HISTORY OF TOTAL LEFT KNEE REPLACEMENT: Status: ACTIVE | Noted: 2024-03-23

## 2024-05-04 NOTE — HISTORY OF PRESENT ILLNESS
[de-identified] : f/u of left knee doing well decreased pain/swelling improved motion now 0-120 knee stable good strength no signs of infection  plan: home exercise program f/u in 1 year.

## 2024-12-03 ENCOUNTER — TRANSCRIPTION ENCOUNTER (OUTPATIENT)
Age: 69
End: 2024-12-03

## 2024-12-03 ENCOUNTER — OUTPATIENT (OUTPATIENT)
Dept: OUTPATIENT SERVICES | Facility: HOSPITAL | Age: 69
LOS: 1 days | Discharge: HOME | End: 2024-12-03
Payer: MEDICARE

## 2024-12-03 VITALS
HEIGHT: 67 IN | DIASTOLIC BLOOD PRESSURE: 70 MMHG | SYSTOLIC BLOOD PRESSURE: 150 MMHG | WEIGHT: 220.02 LBS | TEMPERATURE: 98 F | RESPIRATION RATE: 18 BRPM | HEART RATE: 54 BPM | OXYGEN SATURATION: 97 %

## 2024-12-03 VITALS
SYSTOLIC BLOOD PRESSURE: 156 MMHG | DIASTOLIC BLOOD PRESSURE: 72 MMHG | OXYGEN SATURATION: 97 % | HEART RATE: 52 BPM | RESPIRATION RATE: 18 BRPM

## 2024-12-03 DIAGNOSIS — Z12.11 ENCOUNTER FOR SCREENING FOR MALIGNANT NEOPLASM OF COLON: ICD-10-CM

## 2024-12-03 DIAGNOSIS — Z96.641 PRESENCE OF RIGHT ARTIFICIAL HIP JOINT: Chronic | ICD-10-CM

## 2024-12-03 DIAGNOSIS — Z98.890 OTHER SPECIFIED POSTPROCEDURAL STATES: Chronic | ICD-10-CM

## 2024-12-03 DIAGNOSIS — G47.33 OBSTRUCTIVE SLEEP APNEA (ADULT) (PEDIATRIC): ICD-10-CM

## 2024-12-03 DIAGNOSIS — H40.9 UNSPECIFIED GLAUCOMA: Chronic | ICD-10-CM

## 2024-12-03 PROCEDURE — 88305 TISSUE EXAM BY PATHOLOGIST: CPT | Mod: 26

## 2024-12-03 PROCEDURE — 88305 TISSUE EXAM BY PATHOLOGIST: CPT

## 2024-12-03 RX ORDER — ROSUVASTATIN CALCIUM 5 MG/1
1 TABLET, FILM COATED ORAL
Refills: 0 | DISCHARGE

## 2024-12-03 RX ORDER — LOSARTAN POTASSIUM 100 MG/1
1 TABLET, FILM COATED ORAL
Refills: 0 | DISCHARGE

## 2024-12-03 NOTE — ASU DISCHARGE PLAN (ADULT/PEDIATRIC) - NS MD DC FALL RISK RISK
For information on Fall & Injury Prevention, visit: [email or web address] OR  [email or web address] OR  https://www.cdc.gov/steadi/patient.html

## 2024-12-03 NOTE — ASU DISCHARGE PLAN (ADULT/PEDIATRIC) - CARE PROVIDER_API CALL
Mo Valdez J  Gastroenterology  [address] [place] [address]  Phone: [phone]  Fax: [phone]  Established Patient  Follow Up Time:

## 2024-12-03 NOTE — CHART NOTE - NSCHARTNOTEFT_GEN_A_CORE
PACU ANESTHESIA ADMISSION NOTE      Procedure: colonoscopy  Post op diagnosis:  screening    ____  Intubated  TV:______       Rate: ______      FiO2: ______    __x__  Patent Airway    __x__  Full return of protective reflexes    _x___  Full recovery from anesthesia / back to baseline     Vitals:   T:     98.4      R:   14               BP:  140/72                Sat:   100                P: 53      Mental Status:  __x__ Awake   _____ Alert   _____ Drowsy   _____ Sedated    Nausea/Vomiting:  x____ NO  ______Yes,   See Post - Op Orders          Pain Scale (0-10):  _____    Treatment: ____ None    ___x_ See Post - Op/PCA Orders    Post - Operative Fluids:   ____ Oral   ____ xSee Post - Op Orders    Plan: Discharge:   x____Home       _____Floor     _____Critical Care    _____  Other:_________________    Comments:

## 2024-12-03 NOTE — ASU PATIENT PROFILE, ADULT - MEDICATION HERBAL REMEDIES, PROFILE
Patient called 1/23/21 with muscle spasm and tightness in the neck.  Requesting muscle relaxer, has been helpful in th past.
no

## 2024-12-03 NOTE — ASU DISCHARGE PLAN (ADULT/PEDIATRIC) - FINANCIAL ASSISTANCE
[facility] provides services at a reduced cost to those who are determined to be eligible through [facility]’s financial assistance program. Information regarding [facility]’s financial assistance program can be found by going to [email or web address] or by calling [phone].

## 2024-12-03 NOTE — ASU PATIENT PROFILE, ADULT - FALL HARM RISK - UNIVERSAL INTERVENTIONS
Bed in lowest position, wheels locked, appropriate side rails in place/Call bell, personal items and telephone in reach/Instruct patient to call for assistance before getting out of bed or chair/Non-slip footwear when patient is out of bed/[place] to call system/Physically safe environment - no spills, clutter or unnecessary equipment/Purposeful Proactive Rounding/Room/bathroom lighting operational, light cord in reach

## 2024-12-05 LAB — SURGICAL PATHOLOGY STUDY: SIGNIFICANT CHANGE UP

## 2024-12-06 DIAGNOSIS — G47.33 OBSTRUCTIVE SLEEP APNEA (ADULT) (PEDIATRIC): ICD-10-CM

## 2024-12-06 DIAGNOSIS — J44.9 CHRONIC OBSTRUCTIVE PULMONARY DISEASE, UNSPECIFIED: ICD-10-CM

## 2024-12-06 DIAGNOSIS — K62.1 RECTAL POLYP: ICD-10-CM

## 2024-12-06 DIAGNOSIS — I10 ESSENTIAL (PRIMARY) HYPERTENSION: ICD-10-CM

## 2024-12-06 DIAGNOSIS — K57.30 DIVERTICULOSIS OF LARGE INTESTINE WITHOUT PERFORATION OR ABSCESS WITHOUT BLEEDING: ICD-10-CM

## 2024-12-06 DIAGNOSIS — K21.9 GASTRO-ESOPHAGEAL REFLUX DISEASE WITHOUT ESOPHAGITIS: ICD-10-CM

## 2024-12-06 DIAGNOSIS — Z12.11 ENCOUNTER FOR SCREENING FOR MALIGNANT NEOPLASM OF COLON: ICD-10-CM

## 2024-12-06 DIAGNOSIS — E66.9 OBESITY, UNSPECIFIED: ICD-10-CM

## 2024-12-06 DIAGNOSIS — K64.9 UNSPECIFIED HEMORRHOIDS: ICD-10-CM

## 2024-12-06 DIAGNOSIS — D12.2 BENIGN NEOPLASM OF ASCENDING COLON: ICD-10-CM

## 2024-12-06 DIAGNOSIS — K63.5 POLYP OF COLON: ICD-10-CM

## 2025-01-29 NOTE — ASU DISCHARGE PLAN (ADULT/PEDIATRIC) - FREQUENT HAND WASHING PREVENTS THE SPREAD OF INFECTION.
Patient resting comfortably in cot, breathing even and unlabored. Patient denies any needs at this time. Call light within reach, and bedrail x2.     Statement Selected